# Patient Record
Sex: FEMALE | Race: OTHER | Employment: UNEMPLOYED | URBAN - METROPOLITAN AREA
[De-identification: names, ages, dates, MRNs, and addresses within clinical notes are randomized per-mention and may not be internally consistent; named-entity substitution may affect disease eponyms.]

---

## 2023-10-13 ENCOUNTER — HOSPITAL ENCOUNTER (INPATIENT)
Age: 88
LOS: 7 days | Discharge: NURSING, REHAB OR LONG-TERM CARE | End: 2023-10-20
Attending: STUDENT IN AN ORGANIZED HEALTH CARE EDUCATION/TRAINING PROGRAM | Admitting: STUDENT IN AN ORGANIZED HEALTH CARE EDUCATION/TRAINING PROGRAM
Payer: COMMERCIAL

## 2023-10-13 DIAGNOSIS — R79.89 ELEVATED BRAIN NATRIURETIC PEPTIDE (BNP) LEVEL: Primary | ICD-10-CM

## 2023-10-13 DIAGNOSIS — I48.92 ATRIAL FLUTTER, UNSPECIFIED TYPE (HCC): ICD-10-CM

## 2023-10-13 DIAGNOSIS — M97.8XXA PERI-PROSTHETIC FEMORAL SHAFT FRACTURE: ICD-10-CM

## 2023-10-13 DIAGNOSIS — I48.4 ATYPICAL ATRIAL FLUTTER (HCC): ICD-10-CM

## 2023-10-13 DIAGNOSIS — Z96.649 PERI-PROSTHETIC FEMORAL SHAFT FRACTURE: ICD-10-CM

## 2023-10-13 PROBLEM — M97.02XA PERIPROSTHETIC FRACTURE AROUND INTERNAL PROSTHETIC LEFT HIP JOINT, INITIAL ENCOUNTER (HCC): Status: ACTIVE | Noted: 2023-10-13

## 2023-10-13 PROCEDURE — 2580000003 HC RX 258: Performed by: NURSE PRACTITIONER

## 2023-10-13 PROCEDURE — 6370000000 HC RX 637 (ALT 250 FOR IP): Performed by: NURSE PRACTITIONER

## 2023-10-13 PROCEDURE — 6360000002 HC RX W HCPCS: Performed by: NURSE PRACTITIONER

## 2023-10-13 PROCEDURE — 99222 1ST HOSP IP/OBS MODERATE 55: CPT | Performed by: STUDENT IN AN ORGANIZED HEALTH CARE EDUCATION/TRAINING PROGRAM

## 2023-10-13 PROCEDURE — 2060000000 HC ICU INTERMEDIATE R&B

## 2023-10-13 RX ORDER — TRAMADOL HYDROCHLORIDE 50 MG/1
50 TABLET ORAL EVERY 6 HOURS PRN
Status: DISCONTINUED | OUTPATIENT
Start: 2023-10-13 | End: 2023-10-14

## 2023-10-13 RX ADMIN — TRAMADOL HYDROCHLORIDE 50 MG: 50 TABLET, COATED ORAL at 23:26

## 2023-10-13 RX ADMIN — PIPERACILLIN AND TAZOBACTAM 3375 MG: 3; .375 INJECTION, POWDER, LYOPHILIZED, FOR SOLUTION INTRAVENOUS at 22:35

## 2023-10-13 ASSESSMENT — PAIN DESCRIPTION - DESCRIPTORS: DESCRIPTORS: ACHING

## 2023-10-13 ASSESSMENT — PAIN SCALES - GENERAL: PAINLEVEL_OUTOF10: 8

## 2023-10-13 ASSESSMENT — PAIN DESCRIPTION - ORIENTATION: ORIENTATION: LEFT

## 2023-10-13 ASSESSMENT — PAIN DESCRIPTION - LOCATION: LOCATION: HIP

## 2023-10-14 ENCOUNTER — APPOINTMENT (OUTPATIENT)
Dept: CT IMAGING | Age: 88
End: 2023-10-14
Attending: STUDENT IN AN ORGANIZED HEALTH CARE EDUCATION/TRAINING PROGRAM
Payer: COMMERCIAL

## 2023-10-14 ENCOUNTER — APPOINTMENT (OUTPATIENT)
Dept: GENERAL RADIOLOGY | Age: 88
End: 2023-10-14
Attending: STUDENT IN AN ORGANIZED HEALTH CARE EDUCATION/TRAINING PROGRAM
Payer: COMMERCIAL

## 2023-10-14 PROBLEM — S72.002A CLOSED FRACTURE OF NECK OF LEFT FEMUR (HCC): Status: ACTIVE | Noted: 2023-10-14

## 2023-10-14 PROBLEM — T84.031A: Status: ACTIVE | Noted: 2023-10-14

## 2023-10-14 PROBLEM — Z01.818 PRE-OP EVALUATION: Status: ACTIVE | Noted: 2023-10-14

## 2023-10-14 PROBLEM — R79.89 ELEVATED BRAIN NATRIURETIC PEPTIDE (BNP) LEVEL: Status: ACTIVE | Noted: 2023-10-14

## 2023-10-14 PROBLEM — R54 ADVANCED AGE: Status: ACTIVE | Noted: 2023-10-14

## 2023-10-14 LAB
ALBUMIN SERPL-MCNC: 3 G/DL (ref 3.5–5.2)
ALBUMIN/GLOB SERPL: 0.8 {RATIO} (ref 1–2.5)
ALP SERPL-CCNC: 182 U/L (ref 35–104)
ALT SERPL-CCNC: 12 U/L (ref 5–33)
ANION GAP SERPL CALCULATED.3IONS-SCNC: 12 MMOL/L (ref 9–17)
ANTI-XA UNFRAC HEPARIN: 1.84 IU/L
ANTI-XA UNFRAC HEPARIN: >2 IU/L
AST SERPL-CCNC: 22 U/L
BASOPHILS # BLD: 0.04 K/UL (ref 0–0.2)
BASOPHILS NFR BLD: 0 % (ref 0–2)
BILIRUB SERPL-MCNC: 0.4 MG/DL (ref 0.3–1.2)
BNP SERPL-MCNC: 3306 PG/ML
BUN SERPL-MCNC: 33 MG/DL (ref 8–23)
CALCIUM SERPL-MCNC: 8.8 MG/DL (ref 8.6–10.4)
CHLORIDE SERPL-SCNC: 81 MMOL/L (ref 98–107)
CO2 SERPL-SCNC: 33 MMOL/L (ref 20–31)
CREAT SERPL-MCNC: 1 MG/DL (ref 0.5–0.9)
CRP SERPL HS-MCNC: 126.7 MG/L (ref 0–5)
EKG ATRIAL RATE: 258 BPM
EKG ATRIAL RATE: 87 BPM
EKG Q-T INTERVAL: 378 MS
EKG Q-T INTERVAL: 404 MS
EKG QRS DURATION: 90 MS
EKG QRS DURATION: 96 MS
EKG QTC CALCULATION (BAZETT): 380 MS
EKG QTC CALCULATION (BAZETT): 404 MS
EKG R AXIS: 66 DEGREES
EKG R AXIS: 67 DEGREES
EKG T AXIS: -33 DEGREES
EKG T AXIS: -38 DEGREES
EKG VENTRICULAR RATE: 60 BPM
EKG VENTRICULAR RATE: 61 BPM
EOSINOPHIL # BLD: 0.16 K/UL (ref 0–0.44)
EOSINOPHILS RELATIVE PERCENT: 1 % (ref 1–4)
ERYTHROCYTE [DISTWIDTH] IN BLOOD BY AUTOMATED COUNT: 15.2 % (ref 11.8–14.4)
ERYTHROCYTE [SEDIMENTATION RATE] IN BLOOD BY PHOTOMETRIC METHOD: 74 MM/HR (ref 0–30)
GFR SERPL CREATININE-BSD FRML MDRD: 54 ML/MIN/1.73M2
GLUCOSE BLD-MCNC: 165 MG/DL (ref 65–105)
GLUCOSE BLD-MCNC: 203 MG/DL (ref 65–105)
GLUCOSE BLD-MCNC: 207 MG/DL (ref 65–105)
GLUCOSE BLD-MCNC: 209 MG/DL (ref 65–105)
GLUCOSE BLD-MCNC: 246 MG/DL (ref 65–105)
GLUCOSE SERPL-MCNC: 197 MG/DL (ref 70–99)
HCT VFR BLD AUTO: 30.1 % (ref 36.3–47.1)
HGB BLD-MCNC: 9.4 G/DL (ref 11.9–15.1)
IMM GRANULOCYTES # BLD AUTO: 0.06 K/UL (ref 0–0.3)
IMM GRANULOCYTES NFR BLD: 1 %
INR PPP: 1.8
LACTIC ACID, WHOLE BLOOD: 1.3 MMOL/L (ref 0.7–2.1)
LYMPHOCYTES NFR BLD: 1.03 K/UL (ref 1.1–3.7)
LYMPHOCYTES RELATIVE PERCENT: 8 % (ref 24–43)
MAGNESIUM SERPL-MCNC: 1.9 MG/DL (ref 1.6–2.6)
MCH RBC QN AUTO: 28.3 PG (ref 25.2–33.5)
MCHC RBC AUTO-ENTMCNC: 31.2 G/DL (ref 28.4–34.8)
MCV RBC AUTO: 90.7 FL (ref 82.6–102.9)
MONOCYTES NFR BLD: 0.86 K/UL (ref 0.1–1.2)
MONOCYTES NFR BLD: 7 % (ref 3–12)
NEUTROPHILS NFR BLD: 83 % (ref 36–65)
NEUTS SEG NFR BLD: 10.3 K/UL (ref 1.5–8.1)
NRBC BLD-RTO: 0 PER 100 WBC
OSMOLALITY SERPL: 289 MOSM/KG (ref 275–295)
OSMOLALITY UR: 296 MOSM/KG (ref 80–1300)
OSMOLALITY UR: 298 MOSM/KG (ref 80–1300)
PARTIAL THROMBOPLASTIN TIME: 161.4 SEC (ref 23–36.5)
PARTIAL THROMBOPLASTIN TIME: 45.6 SEC (ref 23–36.5)
PARTIAL THROMBOPLASTIN TIME: 47.2 SEC (ref 23–36.5)
PLATELET # BLD AUTO: 375 K/UL (ref 138–453)
PMV BLD AUTO: 9.9 FL (ref 8.1–13.5)
POTASSIUM SERPL-SCNC: 3.7 MMOL/L (ref 3.7–5.3)
PROT SERPL-MCNC: 6.9 G/DL (ref 6.4–8.3)
PROTHROMBIN TIME: 20.5 SEC (ref 11.7–14.9)
RBC # BLD AUTO: 3.32 M/UL (ref 3.95–5.11)
RBC # BLD: ABNORMAL 10*6/UL
SODIUM SERPL-SCNC: 126 MMOL/L (ref 135–144)
SODIUM UR-SCNC: 31 MMOL/L
SODIUM UR-SCNC: 33 MMOL/L
WBC OTHER # BLD: 12.5 K/UL (ref 3.5–11.3)

## 2023-10-14 PROCEDURE — 6370000000 HC RX 637 (ALT 250 FOR IP): Performed by: STUDENT IN AN ORGANIZED HEALTH CARE EDUCATION/TRAINING PROGRAM

## 2023-10-14 PROCEDURE — 83880 ASSAY OF NATRIURETIC PEPTIDE: CPT

## 2023-10-14 PROCEDURE — 86140 C-REACTIVE PROTEIN: CPT

## 2023-10-14 PROCEDURE — 2060000000 HC ICU INTERMEDIATE R&B

## 2023-10-14 PROCEDURE — 83930 ASSAY OF BLOOD OSMOLALITY: CPT

## 2023-10-14 PROCEDURE — 83605 ASSAY OF LACTIC ACID: CPT

## 2023-10-14 PROCEDURE — 83735 ASSAY OF MAGNESIUM: CPT

## 2023-10-14 PROCEDURE — 85025 COMPLETE CBC W/AUTO DIFF WBC: CPT

## 2023-10-14 PROCEDURE — 6360000002 HC RX W HCPCS: Performed by: NURSE PRACTITIONER

## 2023-10-14 PROCEDURE — 85730 THROMBOPLASTIN TIME PARTIAL: CPT

## 2023-10-14 PROCEDURE — 82947 ASSAY GLUCOSE BLOOD QUANT: CPT

## 2023-10-14 PROCEDURE — 85610 PROTHROMBIN TIME: CPT

## 2023-10-14 PROCEDURE — 99222 1ST HOSP IP/OBS MODERATE 55: CPT | Performed by: INTERNAL MEDICINE

## 2023-10-14 PROCEDURE — 99233 SBSQ HOSP IP/OBS HIGH 50: CPT | Performed by: STUDENT IN AN ORGANIZED HEALTH CARE EDUCATION/TRAINING PROGRAM

## 2023-10-14 PROCEDURE — 51798 US URINE CAPACITY MEASURE: CPT

## 2023-10-14 PROCEDURE — 51702 INSERT TEMP BLADDER CATH: CPT

## 2023-10-14 PROCEDURE — 6360000002 HC RX W HCPCS: Performed by: STUDENT IN AN ORGANIZED HEALTH CARE EDUCATION/TRAINING PROGRAM

## 2023-10-14 PROCEDURE — 84300 ASSAY OF URINE SODIUM: CPT

## 2023-10-14 PROCEDURE — 87040 BLOOD CULTURE FOR BACTERIA: CPT

## 2023-10-14 PROCEDURE — 83935 ASSAY OF URINE OSMOLALITY: CPT

## 2023-10-14 PROCEDURE — 93010 ELECTROCARDIOGRAM REPORT: CPT | Performed by: INTERNAL MEDICINE

## 2023-10-14 PROCEDURE — 85520 HEPARIN ASSAY: CPT

## 2023-10-14 PROCEDURE — 85652 RBC SED RATE AUTOMATED: CPT

## 2023-10-14 PROCEDURE — 80053 COMPREHEN METABOLIC PANEL: CPT

## 2023-10-14 PROCEDURE — 36415 COLL VENOUS BLD VENIPUNCTURE: CPT

## 2023-10-14 PROCEDURE — 93005 ELECTROCARDIOGRAM TRACING: CPT | Performed by: NURSE PRACTITIONER

## 2023-10-14 PROCEDURE — 71045 X-RAY EXAM CHEST 1 VIEW: CPT

## 2023-10-14 PROCEDURE — 2580000003 HC RX 258: Performed by: NURSE PRACTITIONER

## 2023-10-14 PROCEDURE — 51701 INSERT BLADDER CATHETER: CPT

## 2023-10-14 PROCEDURE — 6370000000 HC RX 637 (ALT 250 FOR IP): Performed by: NURSE PRACTITIONER

## 2023-10-14 PROCEDURE — 72192 CT PELVIS W/O DYE: CPT

## 2023-10-14 RX ORDER — INSULIN LISPRO 100 [IU]/ML
0-8 INJECTION, SOLUTION INTRAVENOUS; SUBCUTANEOUS
Status: DISCONTINUED | OUTPATIENT
Start: 2023-10-14 | End: 2023-10-20 | Stop reason: HOSPADM

## 2023-10-14 RX ORDER — INSULIN LISPRO 100 [IU]/ML
0-4 INJECTION, SOLUTION INTRAVENOUS; SUBCUTANEOUS NIGHTLY
Status: DISCONTINUED | OUTPATIENT
Start: 2023-10-14 | End: 2023-10-20 | Stop reason: HOSPADM

## 2023-10-14 RX ORDER — GABAPENTIN 300 MG/1
300 CAPSULE ORAL 3 TIMES DAILY
COMMUNITY

## 2023-10-14 RX ORDER — LANOLIN ALCOHOL/MO/W.PET/CERES
325 CREAM (GRAM) TOPICAL
Status: DISCONTINUED | OUTPATIENT
Start: 2023-10-15 | End: 2023-10-20 | Stop reason: HOSPADM

## 2023-10-14 RX ORDER — FERROUS SULFATE 325(65) MG
325 TABLET ORAL
Status: DISCONTINUED | OUTPATIENT
Start: 2023-10-15 | End: 2023-10-14

## 2023-10-14 RX ORDER — METFORMIN HYDROCHLORIDE 500 MG/1
500 TABLET, EXTENDED RELEASE ORAL
COMMUNITY

## 2023-10-14 RX ORDER — LORAZEPAM 2 MG/ML
0.25 INJECTION INTRAMUSCULAR ONCE
Status: COMPLETED | OUTPATIENT
Start: 2023-10-14 | End: 2023-10-14

## 2023-10-14 RX ORDER — HEPARIN SODIUM 10000 [USP'U]/100ML
5-30 INJECTION, SOLUTION INTRAVENOUS CONTINUOUS
Status: DISCONTINUED | OUTPATIENT
Start: 2023-10-14 | End: 2023-10-18

## 2023-10-14 RX ORDER — ROPINIROLE 1 MG/1
1 TABLET, FILM COATED ORAL 3 TIMES DAILY
COMMUNITY

## 2023-10-14 RX ORDER — METOLAZONE 2.5 MG/1
2.5 TABLET ORAL DAILY
COMMUNITY

## 2023-10-14 RX ORDER — LANOLIN ALCOHOL/MO/W.PET/CERES
800 CREAM (GRAM) TOPICAL DAILY
COMMUNITY

## 2023-10-14 RX ORDER — LOVASTATIN 10 MG/1
10 TABLET ORAL NIGHTLY
COMMUNITY

## 2023-10-14 RX ORDER — ROPINIROLE 1 MG/1
1 TABLET, FILM COATED ORAL 3 TIMES DAILY
Status: DISCONTINUED | OUTPATIENT
Start: 2023-10-14 | End: 2023-10-20 | Stop reason: HOSPADM

## 2023-10-14 RX ORDER — LISINOPRIL 10 MG/1
10 TABLET ORAL DAILY
Status: ON HOLD | COMMUNITY
End: 2023-10-20 | Stop reason: HOSPADM

## 2023-10-14 RX ORDER — HEPARIN SODIUM 1000 [USP'U]/ML
2000 INJECTION, SOLUTION INTRAVENOUS; SUBCUTANEOUS PRN
Status: DISCONTINUED | OUTPATIENT
Start: 2023-10-14 | End: 2023-10-18

## 2023-10-14 RX ORDER — HEPARIN SODIUM 1000 [USP'U]/ML
4000 INJECTION, SOLUTION INTRAVENOUS; SUBCUTANEOUS PRN
Status: DISCONTINUED | OUTPATIENT
Start: 2023-10-14 | End: 2023-10-18

## 2023-10-14 RX ORDER — FERROUS SULFATE 325(65) MG
325 TABLET ORAL
COMMUNITY

## 2023-10-14 RX ORDER — TRAMADOL HYDROCHLORIDE 50 MG/1
50 TABLET ORAL EVERY 4 HOURS PRN
Status: DISCONTINUED | OUTPATIENT
Start: 2023-10-14 | End: 2023-10-20 | Stop reason: HOSPADM

## 2023-10-14 RX ORDER — DEXTROSE MONOHYDRATE 100 MG/ML
INJECTION, SOLUTION INTRAVENOUS CONTINUOUS PRN
Status: DISCONTINUED | OUTPATIENT
Start: 2023-10-14 | End: 2023-10-20 | Stop reason: HOSPADM

## 2023-10-14 RX ORDER — GABAPENTIN 300 MG/1
300 CAPSULE ORAL 3 TIMES DAILY
Status: DISCONTINUED | OUTPATIENT
Start: 2023-10-14 | End: 2023-10-20 | Stop reason: HOSPADM

## 2023-10-14 RX ORDER — METOLAZONE 2.5 MG/1
2.5 TABLET ORAL DAILY
Status: DISCONTINUED | OUTPATIENT
Start: 2023-10-14 | End: 2023-10-20 | Stop reason: HOSPADM

## 2023-10-14 RX ORDER — INSULIN LISPRO 100 [IU]/ML
0-4 INJECTION, SOLUTION INTRAVENOUS; SUBCUTANEOUS
Status: DISCONTINUED | OUTPATIENT
Start: 2023-10-14 | End: 2023-10-14

## 2023-10-14 RX ORDER — CYANOCOBALAMIN (VITAMIN B-12) 500 MCG
800 TABLET ORAL DAILY
Status: DISCONTINUED | OUTPATIENT
Start: 2023-10-14 | End: 2023-10-14

## 2023-10-14 RX ORDER — INSULIN LISPRO 100 [IU]/ML
0-4 INJECTION, SOLUTION INTRAVENOUS; SUBCUTANEOUS NIGHTLY
Status: DISCONTINUED | OUTPATIENT
Start: 2023-10-14 | End: 2023-10-14

## 2023-10-14 RX ORDER — FOLIC ACID 1 MG/1
1000 TABLET ORAL DAILY
Status: DISCONTINUED | OUTPATIENT
Start: 2023-10-14 | End: 2023-10-20 | Stop reason: HOSPADM

## 2023-10-14 RX ORDER — TRAMADOL HYDROCHLORIDE 50 MG/1
50 TABLET ORAL EVERY 6 HOURS PRN
COMMUNITY

## 2023-10-14 RX ORDER — ATORVASTATIN CALCIUM 10 MG/1
10 TABLET, FILM COATED ORAL DAILY
Status: DISCONTINUED | OUTPATIENT
Start: 2023-10-14 | End: 2023-10-20 | Stop reason: HOSPADM

## 2023-10-14 RX ORDER — HEPARIN SODIUM 1000 [USP'U]/ML
4000 INJECTION, SOLUTION INTRAVENOUS; SUBCUTANEOUS ONCE
Status: COMPLETED | OUTPATIENT
Start: 2023-10-14 | End: 2023-10-14

## 2023-10-14 RX ORDER — LATANOPROST 50 UG/ML
1 SOLUTION/ DROPS OPHTHALMIC NIGHTLY
COMMUNITY

## 2023-10-14 RX ORDER — FENTANYL CITRATE 50 UG/ML
25 INJECTION, SOLUTION INTRAMUSCULAR; INTRAVENOUS ONCE
Status: COMPLETED | OUTPATIENT
Start: 2023-10-14 | End: 2023-10-14

## 2023-10-14 RX ADMIN — LORAZEPAM 0.26 MG: 2 INJECTION INTRAMUSCULAR; INTRAVENOUS at 06:00

## 2023-10-14 RX ADMIN — GABAPENTIN 300 MG: 300 CAPSULE ORAL at 20:57

## 2023-10-14 RX ADMIN — TRAMADOL HYDROCHLORIDE 50 MG: 50 TABLET, COATED ORAL at 10:44

## 2023-10-14 RX ADMIN — HEPARIN SODIUM 12 UNITS/KG/HR: 10000 INJECTION, SOLUTION INTRAVENOUS at 07:01

## 2023-10-14 RX ADMIN — PIPERACILLIN AND TAZOBACTAM 3375 MG: 3; .375 INJECTION, POWDER, LYOPHILIZED, FOR SOLUTION INTRAVENOUS at 14:51

## 2023-10-14 RX ADMIN — ROPINIROLE HYDROCHLORIDE 1 MG: 1 TABLET, FILM COATED ORAL at 16:24

## 2023-10-14 RX ADMIN — METOLAZONE 2.5 MG: 2.5 TABLET ORAL at 16:22

## 2023-10-14 RX ADMIN — ROPINIROLE HYDROCHLORIDE 1 MG: 1 TABLET, FILM COATED ORAL at 20:57

## 2023-10-14 RX ADMIN — INSULIN LISPRO 2 UNITS: 100 INJECTION, SOLUTION INTRAVENOUS; SUBCUTANEOUS at 17:09

## 2023-10-14 RX ADMIN — VANCOMYCIN HYDROCHLORIDE 1000 MG: 1 INJECTION, POWDER, LYOPHILIZED, FOR SOLUTION INTRAVENOUS at 17:54

## 2023-10-14 RX ADMIN — FOLIC ACID 1000 MCG: 1 TABLET ORAL at 16:22

## 2023-10-14 RX ADMIN — FENTANYL CITRATE 25 MCG: 50 INJECTION, SOLUTION INTRAMUSCULAR; INTRAVENOUS at 09:20

## 2023-10-14 RX ADMIN — INSULIN LISPRO 1 UNITS: 100 INJECTION, SOLUTION INTRAVENOUS; SUBCUTANEOUS at 09:24

## 2023-10-14 RX ADMIN — ATORVASTATIN CALCIUM 10 MG: 10 TABLET, FILM COATED ORAL at 16:22

## 2023-10-14 RX ADMIN — TRAMADOL HYDROCHLORIDE 50 MG: 50 TABLET, COATED ORAL at 04:36

## 2023-10-14 RX ADMIN — PIPERACILLIN AND TAZOBACTAM 3375 MG: 3; .375 INJECTION, POWDER, LYOPHILIZED, FOR SOLUTION INTRAVENOUS at 05:41

## 2023-10-14 RX ADMIN — HEPARIN SODIUM 4000 UNITS: 1000 INJECTION INTRAVENOUS; SUBCUTANEOUS at 06:59

## 2023-10-14 RX ADMIN — PIPERACILLIN AND TAZOBACTAM 3375 MG: 3; .375 INJECTION, POWDER, LYOPHILIZED, FOR SOLUTION INTRAVENOUS at 21:01

## 2023-10-14 RX ADMIN — GABAPENTIN 300 MG: 300 CAPSULE ORAL at 16:23

## 2023-10-14 ASSESSMENT — PAIN DESCRIPTION - ORIENTATION
ORIENTATION: LEFT

## 2023-10-14 ASSESSMENT — ENCOUNTER SYMPTOMS
ABDOMINAL PAIN: 0
RHINORRHEA: 0
ABDOMINAL DISTENTION: 0
SHORTNESS OF BREATH: 0
COUGH: 0
WHEEZING: 0
BACK PAIN: 0

## 2023-10-14 ASSESSMENT — PAIN DESCRIPTION - LOCATION
LOCATION: HIP

## 2023-10-14 ASSESSMENT — PAIN SCALES - GENERAL
PAINLEVEL_OUTOF10: 10
PAINLEVEL_OUTOF10: 10
PAINLEVEL_OUTOF10: 8
PAINLEVEL_OUTOF10: 10
PAINLEVEL_OUTOF10: 0
PAINLEVEL_OUTOF10: 2

## 2023-10-14 ASSESSMENT — PAIN DESCRIPTION - DESCRIPTORS
DESCRIPTORS: DISCOMFORT
DESCRIPTORS: ACHING

## 2023-10-14 ASSESSMENT — PAIN SCALES - WONG BAKER: WONGBAKER_NUMERICALRESPONSE: 0

## 2023-10-14 NOTE — PROGRESS NOTES
Patient's son David Maurice will be her Sunday at 1:00 pm to speak with Dr. Kasia Cedeno regarding surgery.

## 2023-10-14 NOTE — PROGRESS NOTES
Writer called patients assisted living facility once again inquiring about the home medication list. Patient unsure of what she takes so unable to complete admission questions still.  List was asked to be faxed around 9:30pm pn 10/13

## 2023-10-14 NOTE — CARE COORDINATION
Case Management Assessment  Initial Evaluation    Date/Time of Evaluation: 10/14/2023 2:50 PM  Assessment Completed by: Qing Franklin RN    If patient is discharged prior to next notation, then this note serves as note for discharge by case management. 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 02XA]                   Date / Time: 10/13/2023  9:05 PM    Patient Admission Status: Inpatient   Readmission Risk (Low < 19, Mod (19-27), High > 27): Readmission Risk Score: 15.1    Current PCP: No primary care provider on file. PCP verified by CM? (P) Yes Corinne Puga MD)    Chart Reviewed: Yes      History Provided by: Clay Ramirez) Patient  Patient Orientation: (P) Alert and Oriented, Person, Place, Situation, Self    Patient Cognition: (P) Alert    Hospitalization in the last 30 days (Readmission):  No    If yes, Readmission Assessment in  Navigator will be completed.     Advance Directives:      Code Status: DNR-CCA   Patient's Primary Decision Maker is: (P) Legal Next of Kin      Discharge Planning:    Patient lives with: (P) Other (Comment) ([address]) Type of Home: (P) Assisted living  Primary Care Giver: (P) Self  Patient Support Systems include: (P) Children, Friends/Neighbors, Other (Comment) (Assisted Living staff)   Current Financial resources: (P) Medicare  Current community resources: (P) None  Current services prior to admission: (P) [address] [facility], Other (Comment) (Assisted Living)            Current DME: (P) Shower Chair, Trent Fetch, Wheelchair            Type of Home Care services:  (P) Aide Services, Skilled Therapy    ADLS  Prior functional level: (P) Assistance with the following:, Cooking, Housework, Shopping  Current functional level: (P) Assistance with the following:, Cooking, Housework, Shopping, Mobility    PT AM-PAC:   /24  OT AM-PAC: /24    Family can provide assistance at DC: (P) Other (comment) (Family and facility staff)  Would you like Case Management to discuss the discharge plan with any other family members/significant others, and if so, who? (P) No  Plans to Return to Present Housing: (P) Unknown at present  Other Identified Issues/Barriers to RETURNING to current housing: none  Potential Assistance needed at discharge: (P) [address]            Potential DME:  none  Patient expects to discharge to: (P) Unknown  Plan for transportation at discharge: (P) Family (Family will transport)    Financial    Payor: [employer] / Plan: [employer] / Product Type: *No Product type* /     Does insurance require precert for SNF: Yes    Potential assistance Purchasing Medications: No  Meds-to-Beds request: Yes    No Pharmacies Listed    Notes:    Factors facilitating achievement of predicted outcomes: Family support, Caregiver support, Cooperative, Pleasant, Has needed Durable Medical Equipment at home, Home is wheelchair accessible, and Knowledge about rehab    Barriers to discharge: Pain, Decreased endurance, Lower extremity weakness, Medical complications, and Wound Care    Additional Case Management Notes: Transitional planning: [facility] for [facility]. Address, emergency contacts, PCP and insurance confirmed with patient. Patient denies any needs at this time. 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 33ED]    IF APPLICABLE: The Patient and/or patient representative Graciela Barfield and her family were provided with a choice of provider and agrees with the discharge plan.  Freedom of choice list with basic dialogue that supports the patient's individualized plan of care/goals and shares the quality data associated with the

## 2023-10-14 NOTE — H&P
equal air entry, clear to ausculation, no wheezing, rales or rhonchi, normal effort  Cardiovascular: normal rate, regular rhythm, no murmur, gallop, rub  Abdomen: Soft, nontender, nondistended, normal bowel sounds, no hepatomegaly or splenomegaly  Skin: No gross lesions, rashes, bruising or bleeding on exposed skin area  Extremities: peripheral pulses palpable, no pedal edema or calf pain with palpation, decreased range of motion on the left leg due to severe pain on the left side of the hip. Psych: normal affect    Investigations:      Laboratory Testing:  No results found for this or any previous visit (from the past 24 hour(s)). Imaging/Diagnostics:  No results found. 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, initial encounter ([address]) 10/13/2023 Yes    Hypertension 10/14/2023 Yes    Diabetes mellitus ([address]) 10/14/2023 Yes    Hypercholesterolemia 10/14/2023 Yes    Atrial flutter ([address]) 10/14/2023 Yes    Hyponatremia 10/14/2023 Yes    Fall 10/14/2023 Yes    Leukocytosis 10/14/2023 Yes    Anemia 10/14/2023 Yes    Hypokalemia 10/14/2023 Yes    Renal insufficiency 10/14/2023 Yes       Plan:     Patient status inpatient in the Progressive Unit/Step down    Intertrochanteric fracture of left femur:  Generalized weakness with falls:  Dizziness:   soft tissue gas in the region of the left hip:  Leukocytosis, elevated ESR and CRP:    N.p.o.  Orthopedic consultation  IV vancomycin and Zosyn  Infectious disease consultation  Follow-up with blood culture results  Follow-up with CBC in the morning  Lactic acid  IV hydration    6. Hyponatremia: Sodium of 126 on admission. Patient is given IV hydration we will repeat the sodium level  Correction should be 6 to 8 milliequivalents in next 24 hours  Follow-up with BMP  Urine sodium and osmolality    7. Diabetes mellitus:  On metformin at home, placing on insulin sliding scale  8. Atrial flutter: On Eliquis at home, currently holding for possible surgery  9. Renal insufficiency: Patient presented with creatinine of 1.3, unknown baseline creatinine patient is given IV fluid follow-up with BMP in the morning. No known diagnosis of CKD in the chart. 10.  Hypertension/hypercholesterolemia: Resume home medication once patient start on diet  11 hypokalemia. Repletion follow-up with BMP in the morning  12. Urinary retention: Patient complains of not being able to pee while on the bed. Denies any dysuria. Placing Crowe's catheter since patient has a hip fracture. 13.  History of CHF: No echocardiogram in the chart, patient is on metolazone 2.5 mg daily, patient is on 2 L of oxygen saturating 94% currently denies any orthopnea PND or dyspnea on exertion no suspicion for CHF exacerbation. Follow-up with chest x-ray and BNP. Also getting EKG     Consultations:   IP CONSULT TO PHARMACY  IP CONSULT TO INFECTIOUS DISEASES     Patient is admitted as inpatient status because of co-morbidities listed above, severity of signs and symptoms as outlined, requirement for current medical therapies and most importantly because of direct risk to patient if care not provided in a hospital setting. Expected length of stay > 48 hours. Lorraine Campos MD  10/14/2023  4:19 AM    Copy sent to Dr. Irizarry primary care provider on file.

## 2023-10-14 NOTE — PLAN OF CARE
Problem: Discharge Planning  Goal: Discharge to home or other facility with appropriate resources  10/14/2023 1047 by Stephanie Costa RN  Outcome: Progressing  10/14/2023 0037 by Sophia Miranda RN  Outcome: Progressing     Problem: Skin/Tissue Integrity  Goal: Absence of new skin breakdown  Description: 1. Monitor for areas of redness and/or skin breakdown  2. Assess vascular access sites hourly  3. Every 4-6 hours minimum:  Change oxygen saturation probe site  4. Every 4-6 hours:  If on nasal continuous positive airway pressure, respiratory therapy assess nares and determine need for appliance change or resting period.   10/14/2023 1047 by Stephanie Costa RN  Outcome: Progressing  10/14/2023 0037 by Sophia Miranda RN  Outcome: Progressing     Problem: Safety - Adult  Goal: Free from fall injury  10/14/2023 1047 by Stephanie Costa RN  Outcome: Progressing  10/14/2023 0037 by Sophia Miranda RN  Outcome: Progressing     Problem: ABCDS Injury Assessment  Goal: Absence of physical injury  10/14/2023 1047 by Stephanie Costa RN  Outcome: Progressing  10/14/2023 0037 by Sophia Miranda RN  Outcome: Progressing     Problem: Pain  Goal: Verbalizes/displays adequate comfort level or baseline comfort level  Outcome: Progressing

## 2023-10-14 NOTE — PROGRESS NOTES
New patient admitted to room from [address] [facility] and assessment charted. Writer contacted patients assisted living to clarify home medication list. Stated they will fax over information.  On call NP notified of admission as well as status of home medication list.

## 2023-10-14 NOTE — PROGRESS NOTES
hours.    Invalid input(s): \"PT\"  Chemistry:No results for input(s): \"NA\", \"K\", \"CL\", \"CO2\", \"GLUCOSE\", \"BUN\", \"CREATININE\", \"MG\", \"ANIONGAP\", \"LABGLOM\", \"GFRAA\", \"CALCIUM\", \"CAION\", \"PHOS\", \"PSA\", \"PROBNP\", \"TROPHS\", \"CKTOTAL\", \"CKMB\", \"CKMBINDEX\", \"MYOGLOBIN\", \"DIGOXIN\", \"LACTACIDWB\" in the last 72 hours. Recent Labs     10/14/23  0423   POCGLU 209*     ABG:No results found for: \"POCPH\", \"PHART\", \"PH\", \"POCPCO2\", \"YEN2QIP\", \"PCO2\", \"POCPO2\", \"PO2ART\", \"PO2\", \"POCHCO3\", \"UID4ZPC\", \"HCO3\", \"NBEA\", \"PBEA\", \"BEART\", \"BE\", \"THGBART\", \"THB\", \"HIU6USF\", \"UZUF3PTB\", \"O8CEURTZ\", \"O2SAT\", \"FIO2\"  No results found for: \"SPECIAL\"  No results found for: \"CULTURE\"    Radiology:  No results found. Physical Examination:        Physical Exam  Constitutional:       Appearance: She is well-developed. HENT:      Head: Normocephalic and atraumatic. Eyes:      Conjunctiva/sclera: Conjunctivae normal.      Pupils: Pupils are equal, round, and reactive to light. Neck:      Thyroid: No thyromegaly. Vascular: No JVD. Cardiovascular:      Rate and Rhythm: Normal rate and regular rhythm. Heart sounds: Normal heart sounds. No murmur heard. Pulmonary:      Effort: Pulmonary effort is normal.      Breath sounds: Normal breath sounds. No wheezing. Abdominal:      General: Bowel sounds are normal. There is no distension. Palpations: Abdomen is soft. Tenderness: There is no abdominal tenderness. There is no rebound. Musculoskeletal:         General: Tenderness present. Normal range of motion. Cervical back: Normal range of motion and neck supple. Skin:     Findings: Lesion present. Comments: Dressing in place over surgical site wound. Neurological:      Mental Status: She is alert and oriented to person, place, and time. Cranial Nerves: No cranial nerve deficit.    Psychiatric:         Behavior: Behavior normal.         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 Elevated ESR and CRP, WBC. Evaluated by orthopedic surgery. Plan for OR on 10/16 for irrigation debridement of left hip wound and possible explanation of hardware. Pain control. Antiemetics. Consulted ID as well. On Zosyn and vancomycin for now. H/o Atrial flutter- on eliquis at home. Oral AC on hold for possible procedure. Continued on heparin drip for now. Cardiology consulted for preop clearance. HFpEF- EF 60-65% (1/22). -elevated pro bnp. Clinically stable. Pending ECHO  Hyponatremia/hypochloremia-continue IV fluids  Diabetes mellitus type 2-on insulin sliding scale. Hypoglycemia protocol. POCT glucose checks. Leukocytosis from possible infection of surgical wound site. Xssnxi-yoxgwi-do iron studies. On IV Venofer for 3 doses. 7.  Hypertension: Currently normotensive off medications. Continue to monitor. 8. HPL- Resume home dose of statins.        Rissa Batista MD  10/14/2023  7:46 AM

## 2023-10-15 ENCOUNTER — ANESTHESIA EVENT (OUTPATIENT)
Dept: OPERATING ROOM | Age: 88
End: 2023-10-15
Payer: COMMERCIAL

## 2023-10-15 LAB
ABO + RH BLD: NORMAL
ANION GAP SERPL CALCULATED.3IONS-SCNC: 11 MMOL/L (ref 9–17)
ARM BAND NUMBER: NORMAL
BASOPHILS # BLD: <0.03 K/UL (ref 0–0.2)
BASOPHILS NFR BLD: 0 % (ref 0–2)
BLOOD BANK SAMPLE EXPIRATION: NORMAL
BLOOD GROUP ANTIBODIES SERPL: NEGATIVE
BUN SERPL-MCNC: 29 MG/DL (ref 8–23)
CALCIUM SERPL-MCNC: 9 MG/DL (ref 8.6–10.4)
CHLORIDE SERPL-SCNC: 86 MMOL/L (ref 98–107)
CO2 SERPL-SCNC: 31 MMOL/L (ref 20–31)
CREAT SERPL-MCNC: 1 MG/DL (ref 0.5–0.9)
CRP SERPL HS-MCNC: 97.7 MG/L (ref 0–5)
EOSINOPHIL # BLD: 0.18 K/UL (ref 0–0.44)
EOSINOPHILS RELATIVE PERCENT: 2 % (ref 1–4)
ERYTHROCYTE [DISTWIDTH] IN BLOOD BY AUTOMATED COUNT: 15.3 % (ref 11.8–14.4)
FERRITIN SERPL-MCNC: 1019 NG/ML (ref 13–150)
FOLATE SERPL-MCNC: >20 NG/ML (ref 4.8–24.2)
GFR SERPL CREATININE-BSD FRML MDRD: 54 ML/MIN/1.73M2
GLUCOSE BLD-MCNC: 200 MG/DL (ref 65–105)
GLUCOSE BLD-MCNC: 212 MG/DL (ref 65–105)
GLUCOSE BLD-MCNC: 232 MG/DL (ref 65–105)
GLUCOSE BLD-MCNC: 246 MG/DL (ref 65–105)
GLUCOSE BLD-MCNC: 280 MG/DL (ref 65–105)
GLUCOSE SERPL-MCNC: 201 MG/DL (ref 70–99)
HCT VFR BLD AUTO: 31.4 % (ref 36.3–47.1)
HGB BLD-MCNC: 9.4 G/DL (ref 11.9–15.1)
IMM GRANULOCYTES # BLD AUTO: 0.06 K/UL (ref 0–0.3)
IMM GRANULOCYTES NFR BLD: 1 %
IRON SATN MFR SERPL: 23 % (ref 20–55)
IRON SERPL-MCNC: 40 UG/DL (ref 37–145)
LYMPHOCYTES NFR BLD: 0.95 K/UL (ref 1.1–3.7)
LYMPHOCYTES RELATIVE PERCENT: 10 % (ref 24–43)
MAGNESIUM SERPL-MCNC: 1.9 MG/DL (ref 1.6–2.6)
MCH RBC QN AUTO: 28.5 PG (ref 25.2–33.5)
MCHC RBC AUTO-ENTMCNC: 29.9 G/DL (ref 28.4–34.8)
MCV RBC AUTO: 95.2 FL (ref 82.6–102.9)
MONOCYTES NFR BLD: 0.79 K/UL (ref 0.1–1.2)
MONOCYTES NFR BLD: 8 % (ref 3–12)
NEUTROPHILS NFR BLD: 79 % (ref 36–65)
NEUTS SEG NFR BLD: 7.45 K/UL (ref 1.5–8.1)
NRBC BLD-RTO: 0 PER 100 WBC
PARTIAL THROMBOPLASTIN TIME: 51.8 SEC (ref 23–36.5)
PARTIAL THROMBOPLASTIN TIME: 71 SEC (ref 23–36.5)
PARTIAL THROMBOPLASTIN TIME: 71.8 SEC (ref 23–36.5)
PARTIAL THROMBOPLASTIN TIME: 82.7 SEC (ref 23–36.5)
PLATELET # BLD AUTO: 386 K/UL (ref 138–453)
PMV BLD AUTO: 9.2 FL (ref 8.1–13.5)
POTASSIUM SERPL-SCNC: 3.5 MMOL/L (ref 3.7–5.3)
RBC # BLD AUTO: 3.3 M/UL (ref 3.95–5.11)
RBC # BLD: ABNORMAL 10*6/UL
SODIUM SERPL-SCNC: 128 MMOL/L (ref 135–144)
TIBC SERPL-MCNC: 173 UG/DL (ref 250–450)
UNSATURATED IRON BINDING CAPACITY: 133 UG/DL (ref 112–347)
VANCOMYCIN SERPL-MCNC: 18.4 UG/ML
VIT B12 SERPL-MCNC: 1090 PG/ML (ref 232–1245)
WBC OTHER # BLD: 9.5 K/UL (ref 3.5–11.3)

## 2023-10-15 PROCEDURE — 82306 VITAMIN D 25 HYDROXY: CPT

## 2023-10-15 PROCEDURE — 6370000000 HC RX 637 (ALT 250 FOR IP): Performed by: STUDENT IN AN ORGANIZED HEALTH CARE EDUCATION/TRAINING PROGRAM

## 2023-10-15 PROCEDURE — 82947 ASSAY GLUCOSE BLOOD QUANT: CPT

## 2023-10-15 PROCEDURE — 83550 IRON BINDING TEST: CPT

## 2023-10-15 PROCEDURE — 80202 ASSAY OF VANCOMYCIN: CPT

## 2023-10-15 PROCEDURE — 83735 ASSAY OF MAGNESIUM: CPT

## 2023-10-15 PROCEDURE — 6370000000 HC RX 637 (ALT 250 FOR IP): Performed by: NURSE PRACTITIONER

## 2023-10-15 PROCEDURE — 99232 SBSQ HOSP IP/OBS MODERATE 35: CPT | Performed by: STUDENT IN AN ORGANIZED HEALTH CARE EDUCATION/TRAINING PROGRAM

## 2023-10-15 PROCEDURE — 83540 ASSAY OF IRON: CPT

## 2023-10-15 PROCEDURE — 2580000003 HC RX 258: Performed by: NURSE PRACTITIONER

## 2023-10-15 PROCEDURE — 6360000002 HC RX W HCPCS: Performed by: NURSE PRACTITIONER

## 2023-10-15 PROCEDURE — 86140 C-REACTIVE PROTEIN: CPT

## 2023-10-15 PROCEDURE — 2060000000 HC ICU INTERMEDIATE R&B

## 2023-10-15 PROCEDURE — 80048 BASIC METABOLIC PNL TOTAL CA: CPT

## 2023-10-15 PROCEDURE — 36415 COLL VENOUS BLD VENIPUNCTURE: CPT

## 2023-10-15 PROCEDURE — 86900 BLOOD TYPING SEROLOGIC ABO: CPT

## 2023-10-15 PROCEDURE — 86850 RBC ANTIBODY SCREEN: CPT

## 2023-10-15 PROCEDURE — 85730 THROMBOPLASTIN TIME PARTIAL: CPT

## 2023-10-15 PROCEDURE — 82607 VITAMIN B-12: CPT

## 2023-10-15 PROCEDURE — 82728 ASSAY OF FERRITIN: CPT

## 2023-10-15 PROCEDURE — 82746 ASSAY OF FOLIC ACID SERUM: CPT

## 2023-10-15 PROCEDURE — 85025 COMPLETE CBC W/AUTO DIFF WBC: CPT

## 2023-10-15 PROCEDURE — 86901 BLOOD TYPING SEROLOGIC RH(D): CPT

## 2023-10-15 RX ORDER — POTASSIUM CHLORIDE 20 MEQ/1
40 TABLET, EXTENDED RELEASE ORAL PRN
Status: DISCONTINUED | OUTPATIENT
Start: 2023-10-15 | End: 2023-10-20 | Stop reason: HOSPADM

## 2023-10-15 RX ORDER — DOCUSATE SODIUM 100 MG/1
100 CAPSULE, LIQUID FILLED ORAL DAILY
Status: DISCONTINUED | OUTPATIENT
Start: 2023-10-15 | End: 2023-10-20 | Stop reason: HOSPADM

## 2023-10-15 RX ORDER — SODIUM CHLORIDE 9 MG/ML
INJECTION, SOLUTION INTRAVENOUS PRN
Status: DISCONTINUED | OUTPATIENT
Start: 2023-10-15 | End: 2023-10-20 | Stop reason: HOSPADM

## 2023-10-15 RX ORDER — POLYETHYLENE GLYCOL 3350 17 G/17G
17 POWDER, FOR SOLUTION ORAL DAILY PRN
Status: DISCONTINUED | OUTPATIENT
Start: 2023-10-15 | End: 2023-10-20 | Stop reason: HOSPADM

## 2023-10-15 RX ORDER — INSULIN GLARGINE 100 [IU]/ML
15 INJECTION, SOLUTION SUBCUTANEOUS NIGHTLY
Status: DISCONTINUED | OUTPATIENT
Start: 2023-10-15 | End: 2023-10-17

## 2023-10-15 RX ORDER — POTASSIUM CHLORIDE 7.45 MG/ML
10 INJECTION INTRAVENOUS PRN
Status: DISCONTINUED | OUTPATIENT
Start: 2023-10-15 | End: 2023-10-20 | Stop reason: HOSPADM

## 2023-10-15 RX ADMIN — TRAMADOL HYDROCHLORIDE 50 MG: 50 TABLET, COATED ORAL at 18:40

## 2023-10-15 RX ADMIN — ROPINIROLE HYDROCHLORIDE 1 MG: 1 TABLET, FILM COATED ORAL at 14:33

## 2023-10-15 RX ADMIN — INSULIN LISPRO 2 UNITS: 100 INJECTION, SOLUTION INTRAVENOUS; SUBCUTANEOUS at 16:27

## 2023-10-15 RX ADMIN — GABAPENTIN 300 MG: 300 CAPSULE ORAL at 08:41

## 2023-10-15 RX ADMIN — GABAPENTIN 300 MG: 300 CAPSULE ORAL at 20:48

## 2023-10-15 RX ADMIN — METOLAZONE 2.5 MG: 2.5 TABLET ORAL at 08:41

## 2023-10-15 RX ADMIN — POTASSIUM CHLORIDE 40 MEQ: 1500 TABLET, EXTENDED RELEASE ORAL at 16:26

## 2023-10-15 RX ADMIN — VANCOMYCIN HYDROCHLORIDE 1000 MG: 1 INJECTION, POWDER, LYOPHILIZED, FOR SOLUTION INTRAVENOUS at 16:47

## 2023-10-15 RX ADMIN — HEPARIN SODIUM 20 UNITS/KG/HR: 10000 INJECTION, SOLUTION INTRAVENOUS at 20:58

## 2023-10-15 RX ADMIN — ROPINIROLE HYDROCHLORIDE 1 MG: 1 TABLET, FILM COATED ORAL at 08:41

## 2023-10-15 RX ADMIN — TRAMADOL HYDROCHLORIDE 50 MG: 50 TABLET, COATED ORAL at 14:39

## 2023-10-15 RX ADMIN — FERROUS SULFATE TAB EC 325 MG (65 MG FE EQUIVALENT) 325 MG: 325 (65 FE) TABLET DELAYED RESPONSE at 08:41

## 2023-10-15 RX ADMIN — FOLIC ACID 1000 MCG: 1 TABLET ORAL at 10:11

## 2023-10-15 RX ADMIN — GABAPENTIN 300 MG: 300 CAPSULE ORAL at 14:33

## 2023-10-15 RX ADMIN — ATORVASTATIN CALCIUM 10 MG: 10 TABLET, FILM COATED ORAL at 08:41

## 2023-10-15 RX ADMIN — DOCUSATE SODIUM 100 MG: 100 CAPSULE, LIQUID FILLED ORAL at 14:33

## 2023-10-15 RX ADMIN — HEPARIN SODIUM 16 UNITS/KG/HR: 10000 INJECTION, SOLUTION INTRAVENOUS at 02:10

## 2023-10-15 RX ADMIN — INSULIN GLARGINE 15 UNITS: 100 INJECTION, SOLUTION SUBCUTANEOUS at 12:47

## 2023-10-15 RX ADMIN — INSULIN LISPRO 4 UNITS: 100 INJECTION, SOLUTION INTRAVENOUS; SUBCUTANEOUS at 12:46

## 2023-10-15 RX ADMIN — INSULIN LISPRO 2 UNITS: 100 INJECTION, SOLUTION INTRAVENOUS; SUBCUTANEOUS at 08:40

## 2023-10-15 RX ADMIN — TRAMADOL HYDROCHLORIDE 50 MG: 50 TABLET, COATED ORAL at 06:11

## 2023-10-15 RX ADMIN — ROPINIROLE HYDROCHLORIDE 1 MG: 1 TABLET, FILM COATED ORAL at 20:48

## 2023-10-15 ASSESSMENT — ENCOUNTER SYMPTOMS
SHORTNESS OF BREATH: 0
RHINORRHEA: 0
ABDOMINAL DISTENTION: 0
WHEEZING: 0
ABDOMINAL PAIN: 0
BACK PAIN: 0
COUGH: 0

## 2023-10-15 ASSESSMENT — PAIN DESCRIPTION - LOCATION
LOCATION: HIP

## 2023-10-15 ASSESSMENT — PAIN DESCRIPTION - DESCRIPTORS
DESCRIPTORS: ACHING
DESCRIPTORS: DISCOMFORT
DESCRIPTORS: DISCOMFORT

## 2023-10-15 ASSESSMENT — PAIN SCALES - GENERAL
PAINLEVEL_OUTOF10: 5
PAINLEVEL_OUTOF10: 5
PAINLEVEL_OUTOF10: 6
PAINLEVEL_OUTOF10: 5
PAINLEVEL_OUTOF10: 0

## 2023-10-15 ASSESSMENT — PAIN - FUNCTIONAL ASSESSMENT: PAIN_FUNCTIONAL_ASSESSMENT: PREVENTS OR INTERFERES SOME ACTIVE ACTIVITIES AND ADLS

## 2023-10-15 ASSESSMENT — PAIN DESCRIPTION - ORIENTATION
ORIENTATION: LEFT

## 2023-10-15 NOTE — CONSENT
Informed Consent for Blood Component Transfusion Note    I have discussed with the  POA  the rationale for blood component transfusion; its benefits in treating or preventing fatigue, organ damage, or death; and its risk which includes mild transfusion reactions, rare risk of blood borne infection, or more serious but rare reactions. I have discussed the alternatives to transfusion, including the risk and consequences of not receiving transfusion. The  POA  had an opportunity to ask questions and had agreed to proceed with transfusion of blood components if indicated tomorrow during surgery.     Electronically signed by Wisam Schmitt MD on 10/15/23 at 5:51 PM EDT

## 2023-10-15 NOTE — PLAN OF CARE
Problem: Discharge Planning  Goal: Discharge to home or other facility with appropriate resources  10/14/2023 2140 by Adeline Osorio RN  Outcome: Progressing  Flowsheets (Taken 10/14/2023 1925)  Discharge to home or other facility with appropriate resources: Identify barriers to discharge with patient and caregiver  10/14/2023 1047 by Lillian Hernández RN  Outcome: Progressing     Problem: Skin/Tissue Integrity  Goal: Absence of new skin breakdown  Description: 1. Monitor for areas of redness and/or skin breakdown  2. Assess vascular access sites hourly  3. Every 4-6 hours minimum:  Change oxygen saturation probe site  4. Every 4-6 hours:  If on nasal continuous positive airway pressure, respiratory therapy assess nares and determine need for appliance change or resting period.   10/14/2023 2140 by Adeline Osorio RN  Outcome: Progressing  10/14/2023 1047 by Lillian Hernández RN  Outcome: Progressing     Problem: Safety - Adult  Goal: Free from fall injury  10/14/2023 2140 by Adeline Osorio RN  Outcome: Progressing  Flowsheets (Taken 10/14/2023 2000)  Free From Fall Injury: Instruct family/caregiver on patient safety  10/14/2023 1047 by Lillian Hernández RN  Outcome: Progressing     Problem: ABCDS Injury Assessment  Goal: Absence of physical injury  10/14/2023 2140 by Adeline Osorio RN  Outcome: Progressing  Flowsheets (Taken 10/14/2023 2000)  Absence of Physical Injury: Implement safety measures based on patient assessment  10/14/2023 1047 by Lillian Hernández RN  Outcome: Progressing     Problem: Pain  Goal: Verbalizes/displays adequate comfort level or baseline comfort level  10/14/2023 2140 by Adeline Osorio RN  Outcome: Progressing  Flowsheets (Taken 10/14/2023 1915)  Verbalizes/displays adequate comfort level or baseline comfort level: Encourage patient to monitor pain and request assistance  10/14/2023 1047 by Lillian Hernández RN  Outcome: Progressing

## 2023-10-15 NOTE — ANESTHESIA PRE PROCEDURE
Department of Anesthesiology  Preprocedure Note       Name:  Peter Kothari   Age:  80 y.o.  :  1934                                          MRN:  [id number]         Date:  10/15/2023      Surgeon: Brittney Yen):  Brennan Duane A, DO    Procedure: Procedure(s):  LEFT HIP PROSTHETIC EXPLANT WITH IRRIGATION AND DEBRIDEMENT AND IMPLANTATION OF ANTIBIOTIC HIP SPACER - OSTEOREMEDIES, LATERAL ON PEG BOARD, FLAT TATIANA, FLUEROSCOPY, CURRETTES    Medications prior to admission:   Prior to Admission medications    Medication Sig Start Date End Date Taking? Authorizing Provider   apixaban (ELIQUIS) 5 MG TABS tablet Take 1 tablet by mouth 2 times daily   Yes Nakul Cleary MD   lovastatin (MEVACOR) 10 MG tablet Take 1 tablet by mouth nightly   Yes Nakul Cleary MD   metFORMIN (GLUCOPHAGE-XR) 500 MG extended release tablet Take 1 tablet by mouth daily (with breakfast)   Yes Nakul Cleary MD   rOPINIRole (REQUIP) 1 MG tablet Take 1 tablet by mouth 3 times daily   Yes Nakul Cleary MD   folic acid (FOLVITE) 254 MCG tablet Take 2 tablets by mouth daily   Yes Nakul Cleary MD   latanoprost (XALATAN) 0.005 % ophthalmic solution Place 1 drop into both eyes nightly   Yes Nakul Cleary MD   metFORMIN (GLUCOPHAGE) 1000 MG tablet Take 1 tablet by mouth at bedtime   Yes ProviderNakul MD   ferrous sulfate (IRON 325) 325 (65 Fe) MG tablet Take 1 tablet by mouth daily (with breakfast)   Yes Nakul Cleary MD   gabapentin (NEURONTIN) 300 MG capsule Take 1 capsule by mouth 3 times daily. Yes Nakul Cleary MD   lisinopril (PRINIVIL;ZESTRIL) 10 MG tablet Take 1 tablet by mouth daily   Yes Nakul Cleary MD   metOLazone (ZAROXOLYN) 2.5 MG tablet Take 1 tablet by mouth daily   Yes Nakul Cleary MD   traMADol (ULTRAM) 50 MG tablet Take 1 tablet by mouth every 6 hours as needed for Pain.  Max Daily Amount: 200 mg   Yes Nakul Cleary MD       Current

## 2023-10-15 NOTE — PROGRESS NOTES
Conjunctivae normal.      Pupils: Pupils are equal, round, and reactive to light. Neck:      Thyroid: No thyromegaly. Vascular: No JVD. Cardiovascular:      Rate and Rhythm: Normal rate and regular rhythm. Heart sounds: Normal heart sounds. No murmur heard. Pulmonary:      Effort: Pulmonary effort is normal.      Breath sounds: Normal breath sounds. No wheezing. Abdominal:      General: Bowel sounds are normal. There is no distension. Palpations: Abdomen is soft. Tenderness: There is no abdominal tenderness. There is no rebound. Musculoskeletal:         General: Tenderness present. Normal range of motion. Cervical back: Normal range of motion and neck supple. Skin:     Findings: Lesion present. Comments: Dressing in place over surgical site wound. Neurological:      Mental Status: She is alert and oriented to person, place, and time. Cranial Nerves: No cranial nerve deficit. Psychiatric:         Behavior: Behavior normal.         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, initial encounter ([address]) 10/13/2023 Yes    Elevated brain natriuretic peptide (BNP) level 10/14/2023 Yes    Pre-op evaluation 10/14/2023 Yes    Hypertension 10/14/2023 Yes    Diabetes mellitus ([address]) 10/14/2023 Yes    Hypercholesterolemia 10/14/2023 Yes    Atrial flutter ([address]) 10/14/2023 Yes    Hyponatremia 10/14/2023 Yes    Fall 10/14/2023 Yes    Leukocytosis 10/14/2023 Yes    Anemia 10/14/2023 Yes    Hypokalemia 10/14/2023 Yes    Renal insufficiency 10/14/2023 Yes    Closed fracture of neck of left femur ([address]) 10/14/2023 Yes    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   And  soft tissue swelling around left hip   plan for OR on 10/16 for irrigation debridement of left hip wound and possible explanation of hardware. Per orthopedic surgery. Continued on Vanco.  Pain control. Antiemetics. Appreciate ID input. H/o Atrial flutter-currently stable and asymptomatic. Patient is okay to proceed with surgery per cardiology. Continued on heparin drip for anticoagulation. HFpEF- EF 60-65% (1/22). -elevated pro bnp. Clinically stable. Pending ECHO  Hyponatremia/hypochloremia-continue IV fluids  Diabetes mellitus type 2-blood sugar high this morning. We will add Lantus and adjust  insulin sliding scale. Hypoglycemia protocol. POCT glucose checks. Leukocytosis from possible infection of surgical wound site-resolved. Zofadr-qemdob-ow iron studies. On IV Venofer for 3 doses. 7.  Hypertension: Currently normotensive off medications. Continue to monitor. 8. HPL- on home dose of statins. 9.  Hyponatremia, hypokalemia-continued on electrolyte replacements. Continue IV fluids.   PT OT олег.      Kimmie Rust MD  10/15/2023  7:50 AM

## 2023-10-15 NOTE — PLAN OF CARE
Problem: Discharge Planning  Goal: Discharge to home or other facility with appropriate resources  10/15/2023 1057 by Royal Fanny RN  Outcome: Progressing  10/14/2023 2140 by Corinna Montgomery RN  Outcome: Progressing  Flowsheets (Taken 10/14/2023 1925)  Discharge to home or other facility with appropriate resources: Identify barriers to discharge with patient and caregiver     Problem: Skin/Tissue Integrity  Goal: Absence of new skin breakdown  Description: 1. Monitor for areas of redness and/or skin breakdown  2. Assess vascular access sites hourly  3. Every 4-6 hours minimum:  Change oxygen saturation probe site  4. Every 4-6 hours:  If on nasal continuous positive airway pressure, respiratory therapy assess nares and determine need for appliance change or resting period.   10/15/2023 1057 by Royal Fanny RN  Outcome: Progressing  10/14/2023 2140 by Corinna Montgomery RN  Outcome: Progressing     Problem: Safety - Adult  Goal: Free from fall injury  10/15/2023 1057 by Royal Fanny RN  Outcome: Progressing  10/14/2023 2140 by Corinna Montgomery RN  Outcome: Progressing  Flowsheets (Taken 10/14/2023 2000)  Free From Fall Injury: Instruct family/caregiver on patient safety     Problem: ABCDS Injury Assessment  Goal: Absence of physical injury  10/15/2023 1057 by Royal Fanny RN  Outcome: Progressing  10/14/2023 2140 by Corinna Montgomery RN  Outcome: Progressing  Flowsheets (Taken 10/14/2023 2000)  Absence of Physical Injury: Implement safety measures based on patient assessment     Problem: Pain  Goal: Verbalizes/displays adequate comfort level or baseline comfort level  10/15/2023 1057 by Royal Fanny RN  Outcome: Progressing  Flowsheets  Taken 10/15/2023 0345 by Corinna Montgomery RN  Verbalizes/displays adequate comfort level or baseline comfort level: Encourage patient to monitor pain and request assistance  Taken 10/14/2023 2331 by Corinna Montgomery RN  Verbalizes/displays adequate comfort level or baseline comfort level: Encourage patient to monitor pain and request assistance  10/14/2023 2140 by Ricarda Boudreaux RN  Outcome: Progressing  Flowsheets (Taken 10/14/2023 1915)  Verbalizes/displays adequate comfort level or baseline comfort level: Encourage patient to monitor pain and request assistance

## 2023-10-16 ENCOUNTER — APPOINTMENT (OUTPATIENT)
Dept: GENERAL RADIOLOGY | Age: 88
End: 2023-10-16
Attending: STUDENT IN AN ORGANIZED HEALTH CARE EDUCATION/TRAINING PROGRAM
Payer: COMMERCIAL

## 2023-10-16 ENCOUNTER — APPOINTMENT (OUTPATIENT)
Age: 88
End: 2023-10-16
Attending: STUDENT IN AN ORGANIZED HEALTH CARE EDUCATION/TRAINING PROGRAM
Payer: COMMERCIAL

## 2023-10-16 ENCOUNTER — ANESTHESIA (OUTPATIENT)
Dept: OPERATING ROOM | Age: 88
End: 2023-10-16
Payer: COMMERCIAL

## 2023-10-16 ENCOUNTER — APPOINTMENT (OUTPATIENT)
Dept: CT IMAGING | Age: 88
End: 2023-10-16
Attending: STUDENT IN AN ORGANIZED HEALTH CARE EDUCATION/TRAINING PROGRAM
Payer: COMMERCIAL

## 2023-10-16 LAB
25(OH)D3 SERPL-MCNC: 21.6 NG/ML
25(OH)D3 SERPL-MCNC: 22.4 NG/ML (ref 30–100)
ANION GAP SERPL CALCULATED.3IONS-SCNC: 11 MMOL/L (ref 9–17)
BASOPHILS # BLD: 0.04 K/UL (ref 0–0.2)
BASOPHILS NFR BLD: 1 % (ref 0–2)
BUN SERPL-MCNC: 27 MG/DL (ref 8–23)
CALCIUM SERPL-MCNC: 8.7 MG/DL (ref 8.6–10.4)
CHLORIDE SERPL-SCNC: 87 MMOL/L (ref 98–107)
CO2 SERPL-SCNC: 32 MMOL/L (ref 20–31)
CREAT SERPL-MCNC: 0.8 MG/DL (ref 0.5–0.9)
CRP SERPL HS-MCNC: 58.5 MG/L (ref 0–5)
ECHO AO ASC DIAM: 3.2 CM
ECHO AO ASCENDING AORTA INDEX: 1.79 CM/M2
ECHO AO ROOT DIAM: 3.6 CM
ECHO AO ROOT INDEX: 2.01 CM/M2
ECHO AV AREA PEAK VELOCITY: 1.7 CM2
ECHO AV AREA VTI: 1.2 CM2
ECHO AV AREA/BSA PEAK VELOCITY: 0.9 CM2/M2
ECHO AV AREA/BSA VTI: 0.7 CM2/M2
ECHO AV MEAN GRADIENT: 4 MMHG
ECHO AV MEAN VELOCITY: 1 M/S
ECHO AV PEAK GRADIENT: 8 MMHG
ECHO AV PEAK VELOCITY: 1.4 M/S
ECHO AV VELOCITY RATIO: 0.57
ECHO AV VTI: 34.1 CM
ECHO BSA: 1.85 M2
ECHO EST RA PRESSURE: 8 MMHG
ECHO LA DIAMETER INDEX: 1.84 CM/M2
ECHO LA DIAMETER: 3.3 CM
ECHO LA TO AORTIC ROOT RATIO: 0.92
ECHO LV E' LATERAL VELOCITY: 10 CM/S
ECHO LV E' SEPTAL VELOCITY: 7 CM/S
ECHO LV EF PHYSICIAN: 40 %
ECHO LV FRACTIONAL SHORTENING: 35 % (ref 28–44)
ECHO LV INTERNAL DIMENSION DIASTOLE INDEX: 2.57 CM/M2
ECHO LV INTERNAL DIMENSION DIASTOLIC: 4.6 CM (ref 3.9–5.3)
ECHO LV INTERNAL DIMENSION SYSTOLIC INDEX: 1.68 CM/M2
ECHO LV INTERNAL DIMENSION SYSTOLIC: 3 CM
ECHO LV IVSD: 0.9 CM (ref 0.6–0.9)
ECHO LV MASS 2D: 169.9 G (ref 67–162)
ECHO LV MASS INDEX 2D: 94.9 G/M2 (ref 43–95)
ECHO LV POSTERIOR WALL DIASTOLIC: 1.2 CM (ref 0.6–0.9)
ECHO LV RELATIVE WALL THICKNESS RATIO: 0.52
ECHO LVOT AREA: 2.8 CM2
ECHO LVOT AV VTI INDEX: 0.43
ECHO LVOT DIAM: 1.9 CM
ECHO LVOT MEAN GRADIENT: 1 MMHG
ECHO LVOT PEAK GRADIENT: 3 MMHG
ECHO LVOT PEAK VELOCITY: 0.8 M/S
ECHO LVOT STROKE VOLUME INDEX: 23.3 ML/M2
ECHO LVOT SV: 41.7 ML
ECHO LVOT VTI: 14.7 CM
ECHO MV AREA VTI: 1 CM2
ECHO MV LVOT VTI INDEX: 2.92
ECHO MV MAX VELOCITY: 1.7 M/S
ECHO MV MEAN GRADIENT: 2 MMHG
ECHO MV MEAN VELOCITY: 0.6 M/S
ECHO MV PEAK GRADIENT: 11 MMHG
ECHO MV VTI: 42.9 CM
ECHO PV MAX VELOCITY: 1.1 M/S
ECHO PV PEAK GRADIENT: 4 MMHG
ECHO RIGHT VENTRICULAR SYSTOLIC PRESSURE (RVSP): 59 MMHG
ECHO RV FREE WALL PEAK S': 11 CM/S
ECHO RV TAPSE: 1.8 CM (ref 1.7–?)
ECHO TV REGURGITANT MAX VELOCITY: 3.58 M/S
ECHO TV REGURGITANT PEAK GRADIENT: 51 MMHG
EOSINOPHIL # BLD: 0.3 K/UL (ref 0–0.44)
EOSINOPHILS RELATIVE PERCENT: 4 % (ref 1–4)
ERYTHROCYTE [DISTWIDTH] IN BLOOD BY AUTOMATED COUNT: 15.3 % (ref 11.8–14.4)
GFR SERPL CREATININE-BSD FRML MDRD: >60 ML/MIN/1.73M2
GLUCOSE BLD-MCNC: 172 MG/DL (ref 65–105)
GLUCOSE BLD-MCNC: 183 MG/DL (ref 65–105)
GLUCOSE BLD-MCNC: 186 MG/DL (ref 65–105)
GLUCOSE BLD-MCNC: 229 MG/DL (ref 65–105)
GLUCOSE SERPL-MCNC: 197 MG/DL (ref 70–99)
HCT VFR BLD AUTO: 30.1 % (ref 36.3–47.1)
HGB BLD-MCNC: 9.3 G/DL (ref 11.9–15.1)
IMM GRANULOCYTES # BLD AUTO: 0.06 K/UL (ref 0–0.3)
IMM GRANULOCYTES NFR BLD: 1 %
LYMPHOCYTES NFR BLD: 1.04 K/UL (ref 1.1–3.7)
LYMPHOCYTES RELATIVE PERCENT: 12 % (ref 24–43)
MCH RBC QN AUTO: 28.6 PG (ref 25.2–33.5)
MCHC RBC AUTO-ENTMCNC: 30.9 G/DL (ref 28.4–34.8)
MCV RBC AUTO: 92.6 FL (ref 82.6–102.9)
MONOCYTES NFR BLD: 0.88 K/UL (ref 0.1–1.2)
MONOCYTES NFR BLD: 10 % (ref 3–12)
NEUTROPHILS NFR BLD: 72 % (ref 36–65)
NEUTS SEG NFR BLD: 6.31 K/UL (ref 1.5–8.1)
NRBC BLD-RTO: 0 PER 100 WBC
PARTIAL THROMBOPLASTIN TIME: 105.6 SEC (ref 23–36.5)
PARTIAL THROMBOPLASTIN TIME: 42.6 SEC (ref 23–36.5)
PARTIAL THROMBOPLASTIN TIME: 97.1 SEC (ref 23–36.5)
PLATELET # BLD AUTO: 355 K/UL (ref 138–453)
PMV BLD AUTO: 9 FL (ref 8.1–13.5)
POTASSIUM SERPL-SCNC: 4.5 MMOL/L (ref 3.7–5.3)
RBC # BLD AUTO: 3.25 M/UL (ref 3.95–5.11)
RBC # BLD: ABNORMAL 10*6/UL
SODIUM SERPL-SCNC: 130 MMOL/L (ref 135–144)
WBC OTHER # BLD: 8.6 K/UL (ref 3.5–11.3)

## 2023-10-16 PROCEDURE — 2500000003 HC RX 250 WO HCPCS

## 2023-10-16 PROCEDURE — 86140 C-REACTIVE PROTEIN: CPT

## 2023-10-16 PROCEDURE — 87186 SC STD MICRODIL/AGAR DIL: CPT

## 2023-10-16 PROCEDURE — 88304 TISSUE EXAM BY PATHOLOGIST: CPT

## 2023-10-16 PROCEDURE — 3700000000 HC ANESTHESIA ATTENDED CARE: Performed by: ORTHOPAEDIC SURGERY

## 2023-10-16 PROCEDURE — 6360000002 HC RX W HCPCS: Performed by: ANESTHESIOLOGY

## 2023-10-16 PROCEDURE — 6370000000 HC RX 637 (ALT 250 FOR IP): Performed by: NURSE PRACTITIONER

## 2023-10-16 PROCEDURE — 2709999900 HC NON-CHARGEABLE SUPPLY: Performed by: ORTHOPAEDIC SURGERY

## 2023-10-16 PROCEDURE — 87205 SMEAR GRAM STAIN: CPT

## 2023-10-16 PROCEDURE — 3600000014 HC SURGERY LEVEL 4 ADDTL 15MIN: Performed by: ORTHOPAEDIC SURGERY

## 2023-10-16 PROCEDURE — 0SBB0ZZ EXCISION OF LEFT HIP JOINT, OPEN APPROACH: ICD-10-PCS | Performed by: ORTHOPAEDIC SURGERY

## 2023-10-16 PROCEDURE — 36415 COLL VENOUS BLD VENIPUNCTURE: CPT

## 2023-10-16 PROCEDURE — 87102 FUNGUS ISOLATION CULTURE: CPT

## 2023-10-16 PROCEDURE — 2580000003 HC RX 258

## 2023-10-16 PROCEDURE — C1776 JOINT DEVICE (IMPLANTABLE): HCPCS | Performed by: ORTHOPAEDIC SURGERY

## 2023-10-16 PROCEDURE — 82306 VITAMIN D 25 HYDROXY: CPT

## 2023-10-16 PROCEDURE — 6360000002 HC RX W HCPCS: Performed by: NURSE ANESTHETIST, CERTIFIED REGISTERED

## 2023-10-16 PROCEDURE — 99232 SBSQ HOSP IP/OBS MODERATE 35: CPT | Performed by: STUDENT IN AN ORGANIZED HEALTH CARE EDUCATION/TRAINING PROGRAM

## 2023-10-16 PROCEDURE — 2580000003 HC RX 258: Performed by: ORTHOPAEDIC SURGERY

## 2023-10-16 PROCEDURE — 6360000002 HC RX W HCPCS

## 2023-10-16 PROCEDURE — 93306 TTE W/DOPPLER COMPLETE: CPT | Performed by: INTERNAL MEDICINE

## 2023-10-16 PROCEDURE — 93306 TTE W/DOPPLER COMPLETE: CPT

## 2023-10-16 PROCEDURE — 87070 CULTURE OTHR SPECIMN AEROBIC: CPT

## 2023-10-16 PROCEDURE — 85730 THROMBOPLASTIN TIME PARTIAL: CPT

## 2023-10-16 PROCEDURE — 80048 BASIC METABOLIC PNL TOTAL CA: CPT

## 2023-10-16 PROCEDURE — 87206 SMEAR FLUORESCENT/ACID STAI: CPT

## 2023-10-16 PROCEDURE — 2580000003 HC RX 258: Performed by: NURSE ANESTHETIST, CERTIFIED REGISTERED

## 2023-10-16 PROCEDURE — 82947 ASSAY GLUCOSE BLOOD QUANT: CPT

## 2023-10-16 PROCEDURE — C1713 ANCHOR/SCREW BN/BN,TIS/BN: HCPCS | Performed by: ORTHOPAEDIC SURGERY

## 2023-10-16 PROCEDURE — 6370000000 HC RX 637 (ALT 250 FOR IP): Performed by: STUDENT IN AN ORGANIZED HEALTH CARE EDUCATION/TRAINING PROGRAM

## 2023-10-16 PROCEDURE — C1769 GUIDE WIRE: HCPCS | Performed by: ORTHOPAEDIC SURGERY

## 2023-10-16 PROCEDURE — 2060000000 HC ICU INTERMEDIATE R&B

## 2023-10-16 PROCEDURE — 87075 CULTR BACTERIA EXCEPT BLOOD: CPT

## 2023-10-16 PROCEDURE — 7100000000 HC PACU RECOVERY - FIRST 15 MIN: Performed by: ORTHOPAEDIC SURGERY

## 2023-10-16 PROCEDURE — 0SPB0JZ REMOVAL OF SYNTHETIC SUBSTITUTE FROM LEFT HIP JOINT, OPEN APPROACH: ICD-10-PCS | Performed by: ORTHOPAEDIC SURGERY

## 2023-10-16 PROCEDURE — 6360000002 HC RX W HCPCS: Performed by: ORTHOPAEDIC SURGERY

## 2023-10-16 PROCEDURE — 3700000001 HC ADD 15 MINUTES (ANESTHESIA): Performed by: ORTHOPAEDIC SURGERY

## 2023-10-16 PROCEDURE — 7100000001 HC PACU RECOVERY - ADDTL 15 MIN: Performed by: ORTHOPAEDIC SURGERY

## 2023-10-16 PROCEDURE — 2500000003 HC RX 250 WO HCPCS: Performed by: NURSE ANESTHETIST, CERTIFIED REGISTERED

## 2023-10-16 PROCEDURE — 85025 COMPLETE CBC W/AUTO DIFF WBC: CPT

## 2023-10-16 PROCEDURE — 0SRB0EZ REPLACEMENT OF LEFT HIP JOINT WITH ARTICULATING SPACER, OPEN APPROACH: ICD-10-PCS | Performed by: ORTHOPAEDIC SURGERY

## 2023-10-16 PROCEDURE — 6360000002 HC RX W HCPCS: Performed by: CHIROPRACTOR

## 2023-10-16 PROCEDURE — 76376 3D RENDER W/INTRP POSTPROCES: CPT

## 2023-10-16 PROCEDURE — 87077 CULTURE AEROBIC IDENTIFY: CPT

## 2023-10-16 PROCEDURE — 3600000004 HC SURGERY LEVEL 4 BASE: Performed by: ORTHOPAEDIC SURGERY

## 2023-10-16 DEVICE — STIMULAN® RAPID CURE PROVIDED STERILE FOR SINGLE PATIENT USE. STIMULAN® RAPID CURE CONTAINS CALCIUM SULFATE POWDER AND MIXING SOLUTION IN PRE-MEASURED QUANTITIES SO THAT WHEN MIXED TOGETHER IN A STERILE MIXING BOWL, THE RESULTANT PASTE IS TO BE DIGITALLY PACKED INTO OPEN BONE VOID/GAP TO SET INSITU OR PLACED INTO THE MOULD PROVIDED, THE MIXTURE SETS TO FORM BEADS. THE BIODEGRADABLE, RADIOPAQUE BEADS ARE RESORBED IN APPROXIMATELY 30 – 60 DAYS WHEN USED IN ACCORDANCE WITH THE DEVICE LABELLING. STIMULAN® RAPID CURE IS MANUFACTURED FROM SYNTHETIC IMPLANT GRADE CALCIUM SULFATE DIHYDRATE(CASO4.2H2O) THAT RESORBS AND IS REPLACED WITH BONE DURING THE HEALING PROCESS. ALSO, AS THE BONE VOID FILLER BEADS ARE BIODEGRADABLE AND BIOCOMPATIBLE, THEY MAY BE USED AT AN INFECTED SITE.
Type: IMPLANTABLE DEVICE | Site: HIP | Status: FUNCTIONAL
Brand: STIMULAN® RAPID CURE

## 2023-10-16 DEVICE — HEAD ACET DIA46MM 0.9GM GENTMYCN BASE MOD REMEDY: Type: IMPLANTABLE DEVICE | Site: FEMUR | Status: FUNCTIONAL

## 2023-10-16 DEVICE — SET CBL SL SM DIA2MM VIT BEAD DALL-M: Type: IMPLANTABLE DEVICE | Site: FEMUR | Status: FUNCTIONAL

## 2023-10-16 DEVICE — CUP ACET OD54MM ID46MM GENTMYCN BASE REMEDY: Type: IMPLANTABLE DEVICE | Site: FEMUR | Status: FUNCTIONAL

## 2023-10-16 DEVICE — CEMENT BONE 40GM HI VISC PALACOS R: Type: IMPLANTABLE DEVICE | Site: FEMUR | Status: FUNCTIONAL

## 2023-10-16 DEVICE — IMPLANTABLE DEVICE: Type: IMPLANTABLE DEVICE | Site: FEMUR | Status: FUNCTIONAL

## 2023-10-16 RX ORDER — ERGOCALCIFEROL 1.25 MG/1
50000 CAPSULE ORAL WEEKLY
Status: DISCONTINUED | OUTPATIENT
Start: 2023-10-16 | End: 2023-10-20 | Stop reason: HOSPADM

## 2023-10-16 RX ORDER — SODIUM CHLORIDE 0.9 % (FLUSH) 0.9 %
5-40 SYRINGE (ML) INJECTION EVERY 12 HOURS SCHEDULED
Status: DISCONTINUED | OUTPATIENT
Start: 2023-10-16 | End: 2023-10-17 | Stop reason: HOSPADM

## 2023-10-16 RX ORDER — PROCHLORPERAZINE EDISYLATE 5 MG/ML
5 INJECTION INTRAMUSCULAR; INTRAVENOUS
Status: DISCONTINUED | OUTPATIENT
Start: 2023-10-16 | End: 2023-10-17 | Stop reason: HOSPADM

## 2023-10-16 RX ORDER — PHENYLEPHRINE HCL IN 0.9% NACL 1 MG/10 ML
SYRINGE (ML) INTRAVENOUS PRN
Status: DISCONTINUED | OUTPATIENT
Start: 2023-10-16 | End: 2023-10-16 | Stop reason: SDUPTHER

## 2023-10-16 RX ORDER — PROPOFOL 10 MG/ML
INJECTION, EMULSION INTRAVENOUS PRN
Status: DISCONTINUED | OUTPATIENT
Start: 2023-10-16 | End: 2023-10-16 | Stop reason: SDUPTHER

## 2023-10-16 RX ORDER — MAGNESIUM HYDROXIDE 1200 MG/15ML
LIQUID ORAL CONTINUOUS PRN
Status: COMPLETED | OUTPATIENT
Start: 2023-10-16 | End: 2023-10-16

## 2023-10-16 RX ORDER — DIPHENHYDRAMINE HYDROCHLORIDE 50 MG/ML
12.5 INJECTION INTRAMUSCULAR; INTRAVENOUS
Status: DISCONTINUED | OUTPATIENT
Start: 2023-10-16 | End: 2023-10-17 | Stop reason: HOSPADM

## 2023-10-16 RX ORDER — ONDANSETRON 2 MG/ML
INJECTION INTRAMUSCULAR; INTRAVENOUS PRN
Status: DISCONTINUED | OUTPATIENT
Start: 2023-10-16 | End: 2023-10-16 | Stop reason: SDUPTHER

## 2023-10-16 RX ORDER — VANCOMYCIN HYDROCHLORIDE 1 G/20ML
INJECTION, POWDER, LYOPHILIZED, FOR SOLUTION INTRAVENOUS PRN
Status: DISCONTINUED | OUTPATIENT
Start: 2023-10-16 | End: 2023-10-16 | Stop reason: SDUPTHER

## 2023-10-16 RX ORDER — ERGOCALCIFEROL 1.25 MG/1
50000 CAPSULE ORAL WEEKLY
Qty: 8 CAPSULE | Refills: 1 | Status: SHIPPED | OUTPATIENT
Start: 2023-10-16

## 2023-10-16 RX ORDER — DROPERIDOL 2.5 MG/ML
0.62 INJECTION, SOLUTION INTRAMUSCULAR; INTRAVENOUS
Status: DISCONTINUED | OUTPATIENT
Start: 2023-10-16 | End: 2023-10-17 | Stop reason: HOSPADM

## 2023-10-16 RX ORDER — SODIUM CHLORIDE, SODIUM LACTATE, POTASSIUM CHLORIDE, CALCIUM CHLORIDE 600; 310; 30; 20 MG/100ML; MG/100ML; MG/100ML; MG/100ML
INJECTION, SOLUTION INTRAVENOUS CONTINUOUS PRN
Status: DISCONTINUED | OUTPATIENT
Start: 2023-10-16 | End: 2023-10-16 | Stop reason: SDUPTHER

## 2023-10-16 RX ORDER — FENTANYL CITRATE 50 UG/ML
INJECTION, SOLUTION INTRAMUSCULAR; INTRAVENOUS PRN
Status: DISCONTINUED | OUTPATIENT
Start: 2023-10-16 | End: 2023-10-16 | Stop reason: SDUPTHER

## 2023-10-16 RX ORDER — ERGOCALCIFEROL 1.25 MG/1
50000 CAPSULE ORAL WEEKLY
Qty: 8 CAPSULE | Refills: 1 | Status: SHIPPED | OUTPATIENT
Start: 2023-10-16 | End: 2023-10-20 | Stop reason: HOSPADM

## 2023-10-16 RX ORDER — MIDAZOLAM HYDROCHLORIDE 2 MG/2ML
2 INJECTION, SOLUTION INTRAMUSCULAR; INTRAVENOUS
Status: COMPLETED | OUTPATIENT
Start: 2023-10-16 | End: 2023-10-16

## 2023-10-16 RX ORDER — LABETALOL HYDROCHLORIDE 5 MG/ML
10 INJECTION, SOLUTION INTRAVENOUS
Status: DISCONTINUED | OUTPATIENT
Start: 2023-10-16 | End: 2023-10-17 | Stop reason: HOSPADM

## 2023-10-16 RX ORDER — TRANEXAMIC ACID 10 MG/ML
INJECTION, SOLUTION INTRAVENOUS PRN
Status: DISCONTINUED | OUTPATIENT
Start: 2023-10-16 | End: 2023-10-16 | Stop reason: SDUPTHER

## 2023-10-16 RX ORDER — LIDOCAINE HYDROCHLORIDE 10 MG/ML
INJECTION, SOLUTION EPIDURAL; INFILTRATION; INTRACAUDAL; PERINEURAL PRN
Status: DISCONTINUED | OUTPATIENT
Start: 2023-10-16 | End: 2023-10-16 | Stop reason: SDUPTHER

## 2023-10-16 RX ORDER — SODIUM CHLORIDE 9 MG/ML
INJECTION, SOLUTION INTRAVENOUS CONTINUOUS PRN
Status: DISCONTINUED | OUTPATIENT
Start: 2023-10-16 | End: 2023-10-16 | Stop reason: SDUPTHER

## 2023-10-16 RX ORDER — VANCOMYCIN HYDROCHLORIDE 1 G/20ML
INJECTION, POWDER, LYOPHILIZED, FOR SOLUTION INTRAVENOUS PRN
Status: DISCONTINUED | OUTPATIENT
Start: 2023-10-16 | End: 2023-10-16 | Stop reason: ALTCHOICE

## 2023-10-16 RX ORDER — GENTAMICIN SULFATE 40 MG/ML
INJECTION, SOLUTION INTRAMUSCULAR; INTRAVENOUS PRN
Status: DISCONTINUED | OUTPATIENT
Start: 2023-10-16 | End: 2023-10-16 | Stop reason: ALTCHOICE

## 2023-10-16 RX ORDER — SODIUM CHLORIDE 9 MG/ML
INJECTION, SOLUTION INTRAVENOUS PRN
Status: DISCONTINUED | OUTPATIENT
Start: 2023-10-16 | End: 2023-10-17 | Stop reason: HOSPADM

## 2023-10-16 RX ORDER — FENTANYL CITRATE 50 UG/ML
25 INJECTION, SOLUTION INTRAMUSCULAR; INTRAVENOUS EVERY 5 MIN PRN
Status: DISCONTINUED | OUTPATIENT
Start: 2023-10-16 | End: 2023-10-17 | Stop reason: HOSPADM

## 2023-10-16 RX ORDER — GLYCOPYRROLATE 1 MG/5 ML
SYRINGE (ML) INTRAVENOUS PRN
Status: DISCONTINUED | OUTPATIENT
Start: 2023-10-16 | End: 2023-10-16 | Stop reason: SDUPTHER

## 2023-10-16 RX ORDER — ROCURONIUM BROMIDE 10 MG/ML
INJECTION, SOLUTION INTRAVENOUS PRN
Status: DISCONTINUED | OUTPATIENT
Start: 2023-10-16 | End: 2023-10-16 | Stop reason: SDUPTHER

## 2023-10-16 RX ORDER — SODIUM CHLORIDE 0.9 % (FLUSH) 0.9 %
5-40 SYRINGE (ML) INJECTION PRN
Status: DISCONTINUED | OUTPATIENT
Start: 2023-10-16 | End: 2023-10-17 | Stop reason: HOSPADM

## 2023-10-16 RX ORDER — DEXAMETHASONE SODIUM PHOSPHATE 10 MG/ML
INJECTION INTRAMUSCULAR; INTRAVENOUS PRN
Status: DISCONTINUED | OUTPATIENT
Start: 2023-10-16 | End: 2023-10-16 | Stop reason: SDUPTHER

## 2023-10-16 RX ADMIN — FENTANYL CITRATE 50 MCG: 50 INJECTION, SOLUTION INTRAMUSCULAR; INTRAVENOUS at 21:43

## 2023-10-16 RX ADMIN — VANCOMYCIN HYDROCHLORIDE 1000 MG: 1 INJECTION, POWDER, LYOPHILIZED, FOR SOLUTION INTRAVENOUS at 17:43

## 2023-10-16 RX ADMIN — ROCURONIUM BROMIDE 20 MG: 10 INJECTION, SOLUTION INTRAVENOUS at 18:52

## 2023-10-16 RX ADMIN — TRAMADOL HYDROCHLORIDE 50 MG: 50 TABLET, COATED ORAL at 04:27

## 2023-10-16 RX ADMIN — PROPOFOL 100 MG: 10 INJECTION, EMULSION INTRAVENOUS at 16:53

## 2023-10-16 RX ADMIN — FENTANYL CITRATE 25 MCG: 50 INJECTION, SOLUTION INTRAMUSCULAR; INTRAVENOUS at 19:17

## 2023-10-16 RX ADMIN — Medication 50 MCG: at 21:03

## 2023-10-16 RX ADMIN — ERGOCALCIFEROL 50000 UNITS: 1.25 CAPSULE ORAL at 11:52

## 2023-10-16 RX ADMIN — HYDROMORPHONE HYDROCHLORIDE 0.5 MG: 1 INJECTION, SOLUTION INTRAMUSCULAR; INTRAVENOUS; SUBCUTANEOUS at 23:01

## 2023-10-16 RX ADMIN — METOLAZONE 2.5 MG: 2.5 TABLET ORAL at 11:44

## 2023-10-16 RX ADMIN — ROCURONIUM BROMIDE 30 MG: 10 INJECTION, SOLUTION INTRAVENOUS at 17:33

## 2023-10-16 RX ADMIN — TRANEXAMIC ACID 1 G: 10 INJECTION, SOLUTION INTRAVENOUS at 17:45

## 2023-10-16 RX ADMIN — Medication 50 MCG: at 19:08

## 2023-10-16 RX ADMIN — TRANEXAMIC ACID 1 G: 10 INJECTION, SOLUTION INTRAVENOUS at 21:46

## 2023-10-16 RX ADMIN — PROPOFOL 100 MG: 10 INJECTION, EMULSION INTRAVENOUS at 22:24

## 2023-10-16 RX ADMIN — FENTANYL CITRATE 25 MCG: 50 INJECTION, SOLUTION INTRAMUSCULAR; INTRAVENOUS at 23:15

## 2023-10-16 RX ADMIN — Medication 2000 MG: at 01:08

## 2023-10-16 RX ADMIN — SODIUM CHLORIDE, SODIUM LACTATE, POTASSIUM CHLORIDE, CALCIUM CHLORIDE: 600; 310; 30; 20 INJECTION, SOLUTION INTRAVENOUS at 16:59

## 2023-10-16 RX ADMIN — FENTANYL CITRATE 25 MCG: 50 INJECTION, SOLUTION INTRAMUSCULAR; INTRAVENOUS at 17:50

## 2023-10-16 RX ADMIN — GABAPENTIN 300 MG: 300 CAPSULE ORAL at 11:45

## 2023-10-16 RX ADMIN — FENTANYL CITRATE 100 MCG: 50 INJECTION, SOLUTION INTRAMUSCULAR; INTRAVENOUS at 16:53

## 2023-10-16 RX ADMIN — ONDANSETRON 4 MG: 2 INJECTION INTRAMUSCULAR; INTRAVENOUS at 21:42

## 2023-10-16 RX ADMIN — ROCURONIUM BROMIDE 30 MG: 10 INJECTION, SOLUTION INTRAVENOUS at 16:53

## 2023-10-16 RX ADMIN — SUGAMMADEX 200 MG: 100 INJECTION, SOLUTION INTRAVENOUS at 22:29

## 2023-10-16 RX ADMIN — ROPINIROLE HYDROCHLORIDE 1 MG: 1 TABLET, FILM COATED ORAL at 11:44

## 2023-10-16 RX ADMIN — SODIUM CHLORIDE, POTASSIUM CHLORIDE, SODIUM LACTATE AND CALCIUM CHLORIDE: 600; 310; 30; 20 INJECTION, SOLUTION INTRAVENOUS at 16:48

## 2023-10-16 RX ADMIN — Medication 0.2 MG: at 17:46

## 2023-10-16 RX ADMIN — LIDOCAINE HYDROCHLORIDE 50 MG: 10 INJECTION, SOLUTION EPIDURAL; INFILTRATION; INTRACAUDAL; PERINEURAL at 16:53

## 2023-10-16 RX ADMIN — DEXAMETHASONE SODIUM PHOSPHATE 10 MG: 10 INJECTION INTRAMUSCULAR; INTRAVENOUS at 19:05

## 2023-10-16 RX ADMIN — ROCURONIUM BROMIDE 20 MG: 10 INJECTION, SOLUTION INTRAVENOUS at 17:04

## 2023-10-16 RX ADMIN — SODIUM CHLORIDE: 9 INJECTION, SOLUTION INTRAVENOUS at 19:34

## 2023-10-16 RX ADMIN — Medication 100 MCG: at 16:57

## 2023-10-16 RX ADMIN — Medication 100 MCG: at 17:16

## 2023-10-16 RX ADMIN — MIDAZOLAM HYDROCHLORIDE 1 MG: 1 INJECTION, SOLUTION INTRAMUSCULAR; INTRAVENOUS at 23:35

## 2023-10-16 ASSESSMENT — ENCOUNTER SYMPTOMS
COUGH: 0
SHORTNESS OF BREATH: 0
BACK PAIN: 0
ABDOMINAL PAIN: 0
ABDOMINAL DISTENTION: 0
RHINORRHEA: 0
WHEEZING: 0

## 2023-10-16 ASSESSMENT — PAIN DESCRIPTION - ORIENTATION
ORIENTATION: LEFT
ORIENTATION: LEFT

## 2023-10-16 ASSESSMENT — PAIN SCALES - GENERAL
PAINLEVEL_OUTOF10: 5
PAINLEVEL_OUTOF10: 10
PAINLEVEL_OUTOF10: 6

## 2023-10-16 ASSESSMENT — PATIENT HEALTH QUESTIONNAIRE - PHQ9
SUM OF ALL RESPONSES TO PHQ QUESTIONS 1-9: 0
2. FEELING DOWN, DEPRESSED OR HOPELESS: 0
SUM OF ALL RESPONSES TO PHQ QUESTIONS 1-9: 0
SUM OF ALL RESPONSES TO PHQ9 QUESTIONS 1 & 2: 0
1. LITTLE INTEREST OR PLEASURE IN DOING THINGS: 0

## 2023-10-16 ASSESSMENT — COPD QUESTIONNAIRES: CAT_SEVERITY: MODERATE

## 2023-10-16 ASSESSMENT — PAIN DESCRIPTION - DESCRIPTORS
DESCRIPTORS: BURNING
DESCRIPTORS: BURNING

## 2023-10-16 ASSESSMENT — PAIN DESCRIPTION - LOCATION
LOCATION: LEG
LOCATION: LEG

## 2023-10-16 ASSESSMENT — PAIN - FUNCTIONAL ASSESSMENT: PAIN_FUNCTIONAL_ASSESSMENT: 0-10

## 2023-10-16 NOTE — PROGRESS NOTES
Orthopedic Progress Note    Patient:  Lukasz Mata  YOB: 1934     80 y.o. female    Subjective:  Patient seen and examined. Patient is hard of hearing. No complaints or concerns. No issue overnight. Pain controlled. Denies fever, HA, CP, SOB, N/V, dysuria. ID will provide recommendations pending intraop cultures from today. Vitals reviewed, afebrile    Objective:   Vitals:    10/16/23 0341   BP:    Pulse: (!) 49   Resp: 18   Temp:    SpO2:      Gen: NAD, cooperative    Cardiovascular: Bradycardic rate  Respiratory: No acute respiratory distress    MSK:  LLE: Soft dressing overlying left hip wound. Compartments soft and easily compressible. EHL/FHL/TA/GS complex motor intact. Sural/saphenous/SPN/DPN/plantar nerve distribution SILT. The foot is warm and well-perfused with BCR. Recent Labs     10/14/23  0725 10/15/23  0800 10/16/23  0430   WBC 12.5* 9.5 8.6   HGB 9.4* 9.4* 9.3*   HCT 30.1* 31.4* 30.1*    386 355   INR 1.8  --   --    * 128*  --    K 3.7 3.5*  --    BUN 33* 29*  --    CREATININE 1.0* 1.0*  --    GLUCOSE 197* 201*  --       Meds:  DVT ppx: Please hold till POD#1  See rec for complete list    Impression 80 y.o. female being seen for:    Left periprosthetic proximal femur fracture with loosening of femoral component    Plan  - Plan for OR today, 10/16/2023, with Dr. Sarahy Fitzpatrick   - Son is POA, consent in chart  - WB status: Nonweightbearing to the left lower extremity  - Antibiotics: We will plan for vancomycin once cultures taken intraoperatively. - DVT ppx:  Please hold chemical anticoagulation till POD#1  - Diet: NPO  -  Soft dressings left hip. Okay for reinforcement from a nursing standpoint.  - Multimodal pain regimen.   - Strict Ice to reduce swelling and pain  -Most recent hemoglobin 9.3  - Encourage Incentive Spirometry use  - Please page Orthopedic Surgery Resident on call with any questions/concerns      Radha Jones, DO  Orthopedic Surgery

## 2023-10-16 NOTE — PLAN OF CARE
Problem: Discharge Planning  Goal: Discharge to home or other facility with appropriate resources  10/16/2023 1552 by Zion Saenz RN  Outcome: Progressing  10/16/2023 0452 by Maya Madera RN  Outcome: Progressing     Problem: Skin/Tissue Integrity  Goal: Absence of new skin breakdown  Description: 1. Monitor for areas of redness and/or skin breakdown  2. Assess vascular access sites hourly  3. Every 4-6 hours minimum:  Change oxygen saturation probe site  4. Every 4-6 hours:  If on nasal continuous positive airway pressure, respiratory therapy assess nares and determine need for appliance change or resting period.   10/16/2023 1552 by Zion Saenz RN  Outcome: Progressing  10/16/2023 0452 by Maya Madera RN  Outcome: Progressing     Problem: Safety - Adult  Goal: Free from fall injury  10/16/2023 1552 by Zion Saenz RN  Outcome: Progressing  10/16/2023 0452 by Maya Madera RN  Outcome: Progressing     Problem: ABCDS Injury Assessment  Goal: Absence of physical injury  10/16/2023 1552 by Zion Saenz RN  Outcome: Progressing  10/16/2023 0452 by Maya Madera RN  Outcome: Progressing     Problem: Pain  Goal: Verbalizes/displays adequate comfort level or baseline comfort level  10/16/2023 1552 by Zion Saenz RN  Outcome: Progressing  10/16/2023 0452 by Maya Madera RN  Outcome: Progressing     Problem: Chronic Conditions and Co-morbidities  Goal: Patient's chronic conditions and co-morbidity symptoms are monitored and maintained or improved  Outcome: Progressing

## 2023-10-16 NOTE — PROGRESS NOTES
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  Osteoporosis vs subacute infection  Periprosthetic fracture Lf proximal femur  Advanced age  DM type 2  HTN  Hypercholesterolemia  Atrial flutter, on anticoagulation  DELL  Elevated CRP    Recommendations:   Blood cultures 10/14/23 negative thus far  Vancomycin pending cultures at time of surgery on 10-16-23  MRSA screen     Medical Decision Making/Summary/Discussion:10/16/2023       Infection Control Recommendations   [place] Precautions    Antimicrobial Stewardship Recommendations     Simplification of therapy  Targeted therapy  PK dosing    Coordination of Outpatient Care:   Estimated Length of IV antimicrobials:TBD  Patient will need Midline Catheter Insertion: TBD  Patient will need PICC line Insertion:TBD  Patient will need: Home IV , [address] [place],  SNF,  LTAC: TBD  Patient will need outpatient wound care:    Chief complaint/reason for consultation:   Concern with prosthetic Lt hip infection      History of Present Illness:   Mason Hoyos is a 80y.o.-year-old  female who was initially admitted on 10/13/2023. Patient seen at the request of Brenda Zheng. INITIAL HISTORY:    Elderly patient who resides in an assisted living facility. She apparently fell twice in the last 2 days and has been unable to bear weight on her left leg since her last fall. She was taken to [facility] where X rays showed a non displaced intertrochanteric fracture. In addition she was found to have erythema of the left hip area, leukocytosis. She was started on Zosyn. Patient was transferred to [facility] for Orthopedic care. At [facility] the fracture was confirmed.  The review of the X rays suggested loosening of the femoral component of the prosthetic hip raising the concern for an underlying prosthetic hip 10/14/23 0739    Order Status: Completed Specimen: Blood Updated: 10/16/23 0808     Specimen Description . BLOOD     Special Requests LEFT HAND 6ML     Culture NO GROWTH 2 DAYS    Culture, Blood 1 [[id number]] Collected: 10/14/23 0725    Order Status: Completed Specimen: Blood Updated: 10/16/23 0809     Specimen Description . BLOOD     Special Requests RIGHT FOREARM 3.5ML     Culture NO GROWTH 2 DAYS              Medical Decision Making-Other:     Note:    Laura Arriaza MD  Internal Medicine Resident, PGY-1  [place], [place]  10/16/2023, 9:19 AM          ATTESTATION:    I have discussed the case, including pertinent history and exam findings with the medical resident. I have evaluated the  History, physical findings and pictures of the patient and the key elements of the encounter have been performed by me. I have reviewed the laboratory data, other diagnostic studies and discussed them with the medical resident. I have updated the medical record where necessary. I agree with the assessment, plan and orders as documented by the medical resident and I have modified them as necessary. Elements of Medical Decision Making:  Note: I have independently performed the steps listed below as part of the medical decision making and evaluation. Examined and discussed with patient. Labs, medications, radiologic studies were reviewed with personal review of films  Large amounts of data were reviewed  Discussed with nursing Staff, Discharge planner  Infection Control and Prevention measures reviewed  All prior entries were reviewed  Administer medications as ordered  Prognosis: Guarded  Discharge planning reviewed  Follow up as outpatient.     Jessica Deleon MD.

## 2023-10-16 NOTE — CARE COORDINATION
SBIRT-  Met with pt with family present with consent. Pt states she does not use drugs or drink alcohol  Pt denies having any depression or SI  Screenings were negative. Alcohol Screening and Brief Intervention        No results for input(s): \"ALC\" in the last 72 hours. Alcohol Pre-screening     (WOMEN ONLY) How many times in the past year have you had 4 or more drinks in a day?: None       Drug Pre-Screening   How many times in the past year have you used a recreational drug or used a prescription medication for nonmedical reasons?: None    Drug Screening DAST       Mood Pre-Screening (PHQ-2)  PHQ-2 Score: 0      I have interviewed Alyssa Gallegos, [id number] regarding  Her alcohol consumption/drug use and risk for excessive use. Screenings were negative. Patient  N/A intervention at this time.      Deferred []    Completed on: 10/16/2023   YVETTE CAMILO

## 2023-10-16 NOTE — PROGRESS NOTES
[facility]  Office: [phone]  Wong Austin DO, Jelly Kent DO, Davon Drake DO, Lola Landis DO, Leslie Fritz MD, Jayce Dubose MD, Kenya Valente MD, Julee Leger MD,  Courtney Contreras MD, Andreia Cole MD, Cheng Madrigal, DO, Darien Rangel MD,  Ilya Avilez MD, Jenifer Medeiros MD, Charlesetta Burkitt, DO, Kb Jacobson MD,  Tim Becerril DO, Ellen Swain MD, Jay Jay Ceballos MD, Valentin Basurto MD, Karmen Pinzon MD,  Yong Bamberger, MD, Jalil Britton MD, Yamini Hull MD, Bucky Escobar MD, Gissel Palacios DO, Domitila Hills MD,  Troy Edwards MD, Don Castañeda MD, Aguilar Celeste, CNP,  Quoc Becerra, CNP,, Arline Mejia, CNP,  Henrique Tolliver, DNP, Yeimy Da Silva, CNP, Lizet Carson, CNP, Nikki Damon, CNP, Ragena Rubinstein, CNP, Melba Barton, CNP, Venkat Velázquez, CNP, Brent Rowe, CNS, Clayton Brown, CNP, Jessica Ashraf, [address]    Progress Note    10/16/2023    7:57 AM    Name:   Génesis Groves  MRN:     [id number]     Acct:      [de-identified]   Room:   [address] [place] [address] Day:  3  Admit Date:  10/13/2023  9:05 PM    PCP:   No primary care provider on file. Code Status:  DNR-CCA    Subjective:     C/C: No chief complaint on file. Interval History Status: Stable. Pt seen and examined. Blood pressure little high this morning. .  She denies any current complaints. Echo done showed EF of 40% with anteroseptal and inferior septal hypokinesis and abnormal diastolic function. Mild TR and MR. .   plan for OR per orthopedic surgery today. .    Brief History:     Génesis Groves is a 80 y.o. Non- / non  female who presents with No chief complaint on file. and is admitted to the hospital for the management of Periprosthetic fracture around internal prosthetic left hip joint, initial encounter ([address]).      75-year-old male with past medical history of diabetes mellitus,

## 2023-10-17 ENCOUNTER — APPOINTMENT (OUTPATIENT)
Dept: GENERAL RADIOLOGY | Age: 88
End: 2023-10-17
Attending: STUDENT IN AN ORGANIZED HEALTH CARE EDUCATION/TRAINING PROGRAM
Payer: COMMERCIAL

## 2023-10-17 LAB
ANION GAP SERPL CALCULATED.3IONS-SCNC: 11 MMOL/L (ref 9–17)
ANION GAP SERPL CALCULATED.3IONS-SCNC: 12 MMOL/L (ref 9–17)
BASOPHILS # BLD: <0.03 K/UL (ref 0–0.2)
BASOPHILS NFR BLD: 0 % (ref 0–2)
BUN SERPL-MCNC: 34 MG/DL (ref 8–23)
BUN SERPL-MCNC: 37 MG/DL (ref 8–23)
CALCIUM SERPL-MCNC: 8.7 MG/DL (ref 8.6–10.4)
CALCIUM SERPL-MCNC: 8.8 MG/DL (ref 8.6–10.4)
CHLORIDE SERPL-SCNC: 83 MMOL/L (ref 98–107)
CHLORIDE SERPL-SCNC: 87 MMOL/L (ref 98–107)
CO2 SERPL-SCNC: 26 MMOL/L (ref 20–31)
CO2 SERPL-SCNC: 28 MMOL/L (ref 20–31)
CREAT SERPL-MCNC: 1 MG/DL (ref 0.5–0.9)
CREAT SERPL-MCNC: 1.1 MG/DL (ref 0.5–0.9)
EKG ATRIAL RATE: 241 BPM
EKG Q-T INTERVAL: 394 MS
EKG QRS DURATION: 68 MS
EKG QTC CALCULATION (BAZETT): 376 MS
EKG R AXIS: 41 DEGREES
EKG T AXIS: 20 DEGREES
EKG VENTRICULAR RATE: 55 BPM
EOSINOPHIL # BLD: <0.03 K/UL (ref 0–0.44)
EOSINOPHILS RELATIVE PERCENT: 0 % (ref 1–4)
ERYTHROCYTE [DISTWIDTH] IN BLOOD BY AUTOMATED COUNT: 15.1 % (ref 11.8–14.4)
GFR SERPL CREATININE-BSD FRML MDRD: 48 ML/MIN/1.73M2
GFR SERPL CREATININE-BSD FRML MDRD: 54 ML/MIN/1.73M2
GLUCOSE BLD-MCNC: 231 MG/DL (ref 65–105)
GLUCOSE BLD-MCNC: 255 MG/DL (ref 65–105)
GLUCOSE BLD-MCNC: 287 MG/DL (ref 65–105)
GLUCOSE BLD-MCNC: 299 MG/DL (ref 65–105)
GLUCOSE SERPL-MCNC: 275 MG/DL (ref 70–99)
GLUCOSE SERPL-MCNC: 303 MG/DL (ref 70–99)
HCT VFR BLD AUTO: 28.8 % (ref 36.3–47.1)
HGB BLD-MCNC: 8.6 G/DL (ref 11.9–15.1)
IMM GRANULOCYTES # BLD AUTO: 0.09 K/UL (ref 0–0.3)
IMM GRANULOCYTES NFR BLD: 1 %
LYMPHOCYTES NFR BLD: 0.56 K/UL (ref 1.1–3.7)
LYMPHOCYTES RELATIVE PERCENT: 5 % (ref 24–43)
MCH RBC QN AUTO: 27.9 PG (ref 25.2–33.5)
MCHC RBC AUTO-ENTMCNC: 29.9 G/DL (ref 28.4–34.8)
MCV RBC AUTO: 93.5 FL (ref 82.6–102.9)
MICROORGANISM SPEC CULT: NORMAL
MICROORGANISM/AGENT SPEC: NORMAL
MONOCYTES NFR BLD: 0.28 K/UL (ref 0.1–1.2)
MONOCYTES NFR BLD: 3 % (ref 3–12)
NEUTROPHILS NFR BLD: 92 % (ref 36–65)
NEUTS SEG NFR BLD: 10.33 K/UL (ref 1.5–8.1)
NRBC BLD-RTO: 0 PER 100 WBC
PLATELET # BLD AUTO: 348 K/UL (ref 138–453)
PMV BLD AUTO: 9.5 FL (ref 8.1–13.5)
POTASSIUM SERPL-SCNC: 5.1 MMOL/L (ref 3.7–5.3)
POTASSIUM SERPL-SCNC: 5.5 MMOL/L (ref 3.7–5.3)
POTASSIUM SERPL-SCNC: 5.6 MMOL/L (ref 3.7–5.3)
RBC # BLD AUTO: 3.08 M/UL (ref 3.95–5.11)
SODIUM SERPL-SCNC: 120 MMOL/L (ref 135–144)
SODIUM SERPL-SCNC: 127 MMOL/L (ref 135–144)
SPECIMEN DESCRIPTION: NORMAL
WBC OTHER # BLD: 11.3 K/UL (ref 3.5–11.3)

## 2023-10-17 PROCEDURE — 6370000000 HC RX 637 (ALT 250 FOR IP): Performed by: STUDENT IN AN ORGANIZED HEALTH CARE EDUCATION/TRAINING PROGRAM

## 2023-10-17 PROCEDURE — 93005 ELECTROCARDIOGRAM TRACING: CPT | Performed by: NURSE PRACTITIONER

## 2023-10-17 PROCEDURE — 82947 ASSAY GLUCOSE BLOOD QUANT: CPT

## 2023-10-17 PROCEDURE — 97530 THERAPEUTIC ACTIVITIES: CPT

## 2023-10-17 PROCEDURE — 36415 COLL VENOUS BLD VENIPUNCTURE: CPT

## 2023-10-17 PROCEDURE — 73502 X-RAY EXAM HIP UNI 2-3 VIEWS: CPT

## 2023-10-17 PROCEDURE — 99233 SBSQ HOSP IP/OBS HIGH 50: CPT | Performed by: NURSE PRACTITIONER

## 2023-10-17 PROCEDURE — 2060000000 HC ICU INTERMEDIATE R&B

## 2023-10-17 PROCEDURE — 97163 PT EVAL HIGH COMPLEX 45 MIN: CPT

## 2023-10-17 PROCEDURE — 97535 SELF CARE MNGMENT TRAINING: CPT

## 2023-10-17 PROCEDURE — 97110 THERAPEUTIC EXERCISES: CPT

## 2023-10-17 PROCEDURE — 99232 SBSQ HOSP IP/OBS MODERATE 35: CPT | Performed by: STUDENT IN AN ORGANIZED HEALTH CARE EDUCATION/TRAINING PROGRAM

## 2023-10-17 PROCEDURE — 97167 OT EVAL HIGH COMPLEX 60 MIN: CPT

## 2023-10-17 PROCEDURE — 85025 COMPLETE CBC W/AUTO DIFF WBC: CPT

## 2023-10-17 PROCEDURE — 73552 X-RAY EXAM OF FEMUR 2/>: CPT

## 2023-10-17 PROCEDURE — 80048 BASIC METABOLIC PNL TOTAL CA: CPT

## 2023-10-17 PROCEDURE — 84132 ASSAY OF SERUM POTASSIUM: CPT

## 2023-10-17 PROCEDURE — 51798 US URINE CAPACITY MEASURE: CPT

## 2023-10-17 PROCEDURE — 93010 ELECTROCARDIOGRAM REPORT: CPT | Performed by: INTERNAL MEDICINE

## 2023-10-17 RX ORDER — INSULIN GLARGINE 100 [IU]/ML
20 INJECTION, SOLUTION SUBCUTANEOUS NIGHTLY
Status: DISCONTINUED | OUTPATIENT
Start: 2023-10-17 | End: 2023-10-20 | Stop reason: HOSPADM

## 2023-10-17 RX ADMIN — FERROUS SULFATE TAB EC 325 MG (65 MG FE EQUIVALENT) 325 MG: 325 (65 FE) TABLET DELAYED RESPONSE at 08:12

## 2023-10-17 RX ADMIN — INSULIN LISPRO 4 UNITS: 100 INJECTION, SOLUTION INTRAVENOUS; SUBCUTANEOUS at 12:17

## 2023-10-17 RX ADMIN — INSULIN GLARGINE 20 UNITS: 100 INJECTION, SOLUTION SUBCUTANEOUS at 21:33

## 2023-10-17 RX ADMIN — GABAPENTIN 300 MG: 300 CAPSULE ORAL at 08:12

## 2023-10-17 RX ADMIN — TRAMADOL HYDROCHLORIDE 50 MG: 50 TABLET, COATED ORAL at 08:12

## 2023-10-17 RX ADMIN — GABAPENTIN 300 MG: 300 CAPSULE ORAL at 14:56

## 2023-10-17 RX ADMIN — SODIUM ZIRCONIUM CYCLOSILICATE 5 G: 5 POWDER, FOR SUSPENSION ORAL at 12:17

## 2023-10-17 RX ADMIN — INSULIN LISPRO 4 UNITS: 100 INJECTION, SOLUTION INTRAVENOUS; SUBCUTANEOUS at 08:16

## 2023-10-17 RX ADMIN — ROPINIROLE HYDROCHLORIDE 1 MG: 1 TABLET, FILM COATED ORAL at 19:56

## 2023-10-17 RX ADMIN — GABAPENTIN 300 MG: 300 CAPSULE ORAL at 19:56

## 2023-10-17 RX ADMIN — ROPINIROLE HYDROCHLORIDE 1 MG: 1 TABLET, FILM COATED ORAL at 14:56

## 2023-10-17 RX ADMIN — ROPINIROLE HYDROCHLORIDE 1 MG: 1 TABLET, FILM COATED ORAL at 08:12

## 2023-10-17 RX ADMIN — ATORVASTATIN CALCIUM 10 MG: 10 TABLET, FILM COATED ORAL at 08:12

## 2023-10-17 RX ADMIN — TRAMADOL HYDROCHLORIDE 50 MG: 50 TABLET, COATED ORAL at 03:27

## 2023-10-17 RX ADMIN — INSULIN LISPRO 4 UNITS: 100 INJECTION, SOLUTION INTRAVENOUS; SUBCUTANEOUS at 16:16

## 2023-10-17 RX ADMIN — FOLIC ACID 1000 MCG: 1 TABLET ORAL at 08:12

## 2023-10-17 RX ADMIN — METOLAZONE 2.5 MG: 2.5 TABLET ORAL at 08:12

## 2023-10-17 RX ADMIN — DOCUSATE SODIUM 100 MG: 100 CAPSULE, LIQUID FILLED ORAL at 08:12

## 2023-10-17 ASSESSMENT — PAIN DESCRIPTION - DESCRIPTORS
DESCRIPTORS: BURNING
DESCRIPTORS: BURNING

## 2023-10-17 ASSESSMENT — PAIN SCALES - WONG BAKER
WONGBAKER_NUMERICALRESPONSE: 2
WONGBAKER_NUMERICALRESPONSE: 6
WONGBAKER_NUMERICALRESPONSE: 2

## 2023-10-17 ASSESSMENT — PAIN SCALES - GENERAL
PAINLEVEL_OUTOF10: 4
PAINLEVEL_OUTOF10: 8
PAINLEVEL_OUTOF10: 4

## 2023-10-17 ASSESSMENT — PAIN DESCRIPTION - ORIENTATION
ORIENTATION: LEFT
ORIENTATION: LEFT

## 2023-10-17 ASSESSMENT — PAIN - FUNCTIONAL ASSESSMENT: PAIN_FUNCTIONAL_ASSESSMENT: PREVENTS OR INTERFERES SOME ACTIVE ACTIVITIES AND ADLS

## 2023-10-17 ASSESSMENT — PAIN DESCRIPTION - LOCATION
LOCATION: LEG
LOCATION: LEG

## 2023-10-17 NOTE — PROGRESS NOTES
Occupational Therapy  Facility/Department: [facility]  Occupational Therapy Initial Assessment    Name: Meghan Mancilla  : 1934  MRN: [id number]  Date of Service: 10/17/2023    Discharge Recommendations:  Patient would benefit from continued therapy after discharge       Patient Diagnosis(es): The primary encounter diagnosis was Elevated brain natriuretic peptide (BNP) level. Diagnoses of Atypical atrial flutter ([address]), Atrial flutter, unspecified type ([address]), and Sherron-prosthetic femoral shaft fracture were also pertinent to this visit. Past Medical History:  has a past medical history of Atrial flutter ([address]), Diabetes mellitus ([address]), Hypercholesterolemia, Hypertension, and Sleep apnea. Past Surgical History:  has a past surgical history that includes Carpal tunnel release (Bilateral); Cholecystectomy; Cataract extraction (Bilateral); Hysterectomy; Abscess Drainage; Total hip arthroplasty (Bilateral); and hip surgery (Left, 10/16/2023). Assessment   Performance deficits / Impairments: Decreased functional mobility ; Decreased endurance;Decreased ADL status; Decreased balance;Decreased high-level IADLs  Prognosis: Good  Decision Making: Medium Complexity  REQUIRES OT FOLLOW-UP: Yes  Activity Tolerance  Activity Tolerance: Patient limited by pain; Patient Tolerated treatment well  Activity Tolerance Comments: Anxious        Plan   Occupational Therapy Plan  Times Per Week: 4-5x     Restrictions  Restrictions/Precautions  Restrictions/Precautions: Weight Bearing, Fall Risk, Up as Tolerated, Surgical Protocols (pt has L hip drain)  Required Braces or Orthoses?: No  Lower Extremity Weight Bearing Restrictions  Right Lower Extremity Weight Bearing: Weight Bearing As Tolerated  Left Lower Extremity Weight Bearing: Weight Bearing As Tolerated  Position Activity Restriction  Other position/activity restrictions: pt has spacer L hip--per ortho, WBAT    Subjective   General  Patient assessed for rehabilitation 0932         Time Out 1017         Minutes 45         Timed Code Treatment Minutes: 23 Minutes       Octavio Cisse, OTR/L

## 2023-10-17 NOTE — PLAN OF CARE
Problem: Discharge Planning  Goal: Discharge to home or other facility with appropriate resources  10/17/2023 1038 by Etta Chisholm RN  Outcome: Progressing  10/17/2023 0056 by Rob White RN  Outcome: Progressing  Flowsheets (Taken 10/17/2023 0030)  Discharge to home or other facility with appropriate resources: Identify barriers to discharge with patient and caregiver     Problem: Skin/Tissue Integrity  Goal: Absence of new skin breakdown  Description: 1. Monitor for areas of redness and/or skin breakdown  2. Assess vascular access sites hourly  3. Every 4-6 hours minimum:  Change oxygen saturation probe site  4. Every 4-6 hours:  If on nasal continuous positive airway pressure, respiratory therapy assess nares and determine need for appliance change or resting period.   10/17/2023 1038 by Etta Chisholm RN  Outcome: Progressing  10/17/2023 0056 by Rob White RN  Outcome: Progressing     Problem: Safety - Adult  Goal: Free from fall injury  10/17/2023 1038 by Etta Chisholm RN  Outcome: Progressing  10/17/2023 0056 by Rob White RN  Outcome: Progressing  Flowsheets (Taken 10/17/2023 0045)  Free From Fall Injury: Instruct family/caregiver on patient safety     Problem: ABCDS Injury Assessment  Goal: Absence of physical injury  10/17/2023 1038 by Etta Chisholm RN  Outcome: Progressing  10/17/2023 0056 by Rob White RN  Outcome: Progressing  Flowsheets (Taken 10/17/2023 0045)  Absence of Physical Injury: Implement safety measures based on patient assessment     Problem: Pain  Goal: Verbalizes/displays adequate comfort level or baseline comfort level  10/17/2023 1038 by Etta Chisholm RN  Outcome: Progressing  Flowsheets (Taken 10/17/2023 0323 by Rob White RN)  Verbalizes/displays adequate comfort level or baseline comfort level: Encourage patient to monitor pain and request assistance  10/17/2023 0056 by Rob White RN  Outcome: Progressing  Flowsheets (Taken

## 2023-10-17 NOTE — PROGRESS NOTES
Physical Therapy  Facility/Department: [facility]  Physical Therapy Initial Assessment    Name: Capo Cisneros  : 1934  MRN: [id number]  Date of Service: 10/17/2023    History copied and pasted from H+P  80-year-old female with past medical history of diabetes mellitus, hypertension, hypercholesterolemia, atrial flutter on Eliquis, sleep apnea presented to outside facility with complaints of generalized weakness. Patient lives in assisted living, where she fell 2 times in the last 3 days pivoted on walker with the aid lead to assisted let down. Also slipped out of bed today. Patient mentioned that she has been unable to bear weight on her left leg for the last 2 days and complain of left hip pain, imaging was done which showed nondisplaced intertrochanteric fracture. Discussed with Ortho on-call recommended patient will need total hip arthroplasty again and cannot be managed at [facility], CT also showed soft tissue concerns for cellulitis. Started on IV antibiotics and cultures were obtained. Imaging results closed intertrochanteric fracture of left femur and cellulitis of the left hip loosening of femoral component of prosthetic left hip. Pt underwent Left hip irrigation and debridement down to bone  Left total hip hardware explantation with insertion of antibiotic spacer  Insertion of antibiotic stimulan beads left hip  Open treatment humberto-prosthetic left femur fracture 10/16/23. Discharge Recommendations:  Further therapy recommended at discharge. PT Equipment Recommendations  Equipment Needed: No      Patient Diagnosis(es): The primary encounter diagnosis was Elevated brain natriuretic peptide (BNP) level. Diagnoses of Atypical atrial flutter ([address]), Atrial flutter, unspecified type ([address]), and Humberto-prosthetic femoral shaft fracture were also pertinent to this visit.   Past Medical History:  has a past medical history of Atrial flutter ([address]), Diabetes mellitus ([address]), Hypercholesterolemia, Hypertension, and Sleep apnea. Past Surgical History:  has a past surgical history that includes Carpal tunnel release (Bilateral); Cholecystectomy; Cataract extraction (Bilateral); Hysterectomy; Abscess Drainage; Total hip arthroplasty (Bilateral); and hip surgery (Left, 10/16/2023). Assessment   Pt reluctantly cooperative with evaluation--very fearful of having increased pain with movement; denies pain when lying still in bed. PT spoke with pt at [address], pt very anxious re: increased pain; RN gave pain meds and PT returned at 9:30 for eval.  Max A+2 for bed mobility; pt able to dangle EOB w/o complaints for at least 20 minutes (OT working with pt on use of sock aide during part of that time) with SBA+1; stand pivot transfers using rwalker and max A+2--able to bear wt but poor ability to move feet, brian LLE. Pt would benefit from continued therapies prior to returning to previous level of care to improve mobility, safety, functional independence. Body Structures, Functions, Activity Limitations Requiring Skilled Therapeutic Intervention: Decreased functional mobility ; Decreased ROM; Decreased body mechanics; Decreased strength;Decreased safe awareness;Decreased cognition;Decreased balance; Increased pain;Decreased posture  Therapy Prognosis: Fair  Decision Making: High Complexity  Barriers to Learning: high anxiety  Requires PT Follow-Up: Yes  Activity Tolerance  Activity Tolerance: Patient limited by pain; Other (comment)  Activity Tolerance Comments: pt very anxious and needs to be calmed down so that she can continue with mobility--pt able to self calm with verbal cues     Plan   Physcial Therapy Plan  General Plan:  (5-6 visits weekly)  Current Treatment Recommendations: Strengthening, Balance training, Functional mobility training, Transfer training, Endurance training, Wheelchair mobility training, Gait training, Pain management, Home exercise program, Safety education & training,

## 2023-10-17 NOTE — DISCHARGE INSTRUCTIONS
Orthopedic Instructions:  -Weight bearing status: Weight bearing as tolerated for the left leg with walker  -Keep dressing clean and dry.  -Do not remove dressings  -Always look for signs of compartment syndrome: pain out of proportion to the injury, pain not controlled with pain medication, numbness in digits, changing of color of digits (paleness). If these signs occur return to ED immediately for reassessment.   -Always work on motion (to non-injured toes) to decrease swelling.  -Ok to shower but no soaks in a bath, hot tub, pool, etc  -Ice (20 minutes on and off 1 hour) and elevate above the level of the heart to reduce swelling and throbbing pain. -Drink plenty of fluids.  -Call the office or come to Emergency Room if signs of infection appear (hot, swollen, red, draining pus, fever)  -Take medications as prescribed.  -Wean off narcotics (percocet/norco) as soon as possible. Do not take tylenol if still taking narcotics.  -Follow up with  Ortho trauma clinic  in their office on 11/1/23 at 2 pm after surgery. Call [phone] to schedule/confirm.

## 2023-10-17 NOTE — PROGRESS NOTES
Orthopedic Progress Note    Patient:  Kae Bautista  YOB: 1934     80 y.o. female    Subjective:  Patient seen and examined at bedside this morning  Patient bradycardic overnight (HR 40s)  Alert and oriented upon ortho evaluation  Pain controlled, resting comfortably   Denies fever, HA, CP, SOB, N/V, dysuria    Vitals reviewed, afebrile    Objective:   Vitals:    10/17/23 0357   BP:    Pulse:    Resp: 15   Temp:    SpO2:      Gen: NAD, cooperative   Cardiovascular: Regular rate  Respiratory: Chest symmetric, no accessory muscle use        Orthopedic Exam:  LLE: Optifoams on, which are C/D/I. Hemovac drain sewn in which, currently has 45 cc of bloody output in cannister (90cc throughout shift). TTP about the hip. Compartments soft and easily compressible. EHL/FHL/TA/GS complex motor intact. Sural/saphenous/SPN/DPN/plantar nerve distribution SILT. DP and PT pulses 2+ with BCR. Recent Labs     10/14/23  0725 10/15/23  0800 10/16/23  0430   WBC 12.5*   < > 8.6   HGB 9.4*   < > 9.3*   HCT 30.1*   < > 30.1*      < > 355   INR 1.8  --   --    *   < > 130*   K 3.7   < > 4.5   BUN 33*   < > 27*   CREATININE 1.0*   < > 0.8   GLUCOSE 197*   < > 197*    < > = values in this interval not displayed. Meds:  Chemical AC: Heparin  ABX: Vancomycin  See rec for complete list    Impression/plan: 80 y.o. female who sustained a left periprosthetic proximal femur fracture with septic loosening of femoral component being seen for:     - Left hip I&D/explant and stage 1 hip antibiotic spacer application/ORIF femur, POD1    -No plans for further orthopedic intervention during hospital admission  -WB status: WBAT LLE  -Pain control per primary  -Intraop cultures showing NGTD currently, will continue ton trend  -Will follow up intraop pathology cultures  -Drain in with 90cc of bloody output overnight, will continue to maintain.    -DVT ppx: heparin, ok to continue chemical AC today (POD1)  -Ice

## 2023-10-17 NOTE — PLAN OF CARE
Problem: Discharge Planning  Goal: Discharge to home or other facility with appropriate resources  10/17/2023 0056 by Zuri Mcknight RN  Outcome: Progressing  Flowsheets (Taken 10/17/2023 0030)  Discharge to home or other facility with appropriate resources: Identify barriers to discharge with patient and caregiver  10/16/2023 1552 by Areli Pettit RN  Outcome: Progressing     Problem: Skin/Tissue Integrity  Goal: Absence of new skin breakdown  Description: 1. Monitor for areas of redness and/or skin breakdown  2. Assess vascular access sites hourly  3. Every 4-6 hours minimum:  Change oxygen saturation probe site  4. Every 4-6 hours:  If on nasal continuous positive airway pressure, respiratory therapy assess nares and determine need for appliance change or resting period.   10/17/2023 0056 by Zuri Mcknight RN  Outcome: Progressing  10/16/2023 1552 by Areli Pettit RN  Outcome: Progressing     Problem: Safety - Adult  Goal: Free from fall injury  10/17/2023 0056 by Zuri Mcknight RN  Outcome: Progressing  10/16/2023 1552 by Areli Pettit RN  Outcome: Progressing     Problem: ABCDS Injury Assessment  Goal: Absence of physical injury  10/17/2023 0056 by Zuri Mcknight RN  Outcome: Progressing  10/16/2023 1552 by Areli Pettit RN  Outcome: Progressing     Problem: Pain  Goal: Verbalizes/displays adequate comfort level or baseline comfort level  10/17/2023 0056 by Zuri Mcknight RN  Outcome: Progressing  10/16/2023 1552 by Areli Pettit RN  Outcome: Progressing     Problem: Chronic Conditions and Co-morbidities  Goal: Patient's chronic conditions and co-morbidity symptoms are monitored and maintained or improved  10/17/2023 0056 by Zuri Mcknight RN  Outcome: Progressing  Flowsheets (Taken 10/17/2023 0030)  Care Plan - Patient's Chronic Conditions and Co-Morbidity Symptoms are Monitored and Maintained or Improved: Monitor and assess patient's chronic conditions and comorbid

## 2023-10-17 NOTE — CARE COORDINATION
Transitional Planning:  Spoke with patient, son & daughter at bedside re: rehab [phone]. They would like [facility] on [facility]. It is 24/7 facility. If not accepted they would like [facility]. Patient currently is in their assisted living. Call to [facility]. Left message with admissions for cb. Asked for fax number for [facility]. Call back from [address] [place] [address] [phone] at [facility]. Fax is [phone]. Clincials faxed. They will review clinicals first thing in am and anticipate they can accept. CM needs to verfiy in am that they have started precert.

## 2023-10-17 NOTE — BRIEF OP NOTE
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  Left periprosthetic proximal femur fracture       Procedure(s):  Left hip irrigation and debridement down to bone 98460  Left total hip hardware explantation with insertion of antibiotic spacer 60336  Insertion of antibiotic stimulan beads left hip 20700  Open treatment humberto-prosthetic left femur fracture 05477    Surgeon(s):  Asim Fields DO    Assistant:  Resident: Jocelin Lainez; Han Theodore DO    Anesthesia: General    Estimated Blood Loss (mL): 200     Fluids: 2200mL crystalloids    Complications: None    Specimens:   ID Type Source Tests Collected by Time Destination   1 : LEFT HIP Swab Hip CULTURE, WOUND (Canceled), CULTURE, ANAEROBIC AND AEROBIC [facility] 10/16/2023 1745    A : LEFT HIP Tissue Hip SURGICAL PATHOLOGY, CULTURE, FUNGUS, CULTURE, TISSUE, CULTURE, ANAEROBIC AND AEROBIC (Canceled) Janajesus MIDDLETON,  10/16/2023 1812        Implants:  Osteoremidies 54 mm cup, 46 mm head, medium long stem  2 mm cable x 2  Implant Name Type Inv.  Item Serial No.  Lot No. LRB No. Used Action   SET CBL SL SM DIA2MM VIT BEAD DALL-M - [id number]  SET CBL SL SM DIA2MM VIT BEAD DALL-M  [employer] [facility] [id number] Left 1 Implanted   SET CBL SL SM DIA2MM VIT BEAD DALL-M - [id number]  SET CBL SL SM DIA2MM VIT BEAD DALL-M  [employer] [facility] [id number] Left 1 Implanted   STEM FEM M LNG HIP REMEDY - [id number]  STEM FEM M LNG HIP REMEDY  OSTEOREMEDIES- [id number] Left 1 Implanted   HEAD ACET [id number] 0.9GM GENTMYCN BASE MOD REMEDY - [id number]  HEAD ACET [id number] 0.9GM GENTMYCN BASE MOD REMEDY  OSTEOREMEDIES- [id number] Left 1 Implanted   CUP ACET OD54MM [id number] GENTMYCN

## 2023-10-17 NOTE — PROGRESS NOTES
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  Osteoporosis vs subacute infection  Periprosthetic fracture Lf proximal femur  S/P Left hip irrigation and debridement down to bone on 10-16-23  Left total hip hardware explantation with insertion of antibiotic spacer  Insertion of antibiotic stimulan beads left hip  Open treatment humberto-prosthetic left femur fracture  Advanced age  DM type 2  HTN  Hypercholesterolemia  Atrial flutter, on anticoagulation  DELL  Elevated CRP    Recommendations:   Blood cultures 10/14/23 negative thus far  Vancomycin given at time of surgery on 10-16-23  MRSA screen     Medical Decision Making/Summary/Discussion:10/17/2023       Infection Control Recommendations   [place] Precautions    Antimicrobial Stewardship Recommendations     Simplification of therapy  Targeted therapy  PK dosing    Coordination of Outpatient Care:   Estimated Length of IV antimicrobials:TBD  Patient will need Midline Catheter Insertion: TBD  Patient will need PICC line Insertion:TBD  Patient will need: Home IV , [address] [facility],  SNF,  LTAC: TBD  Patient will need outpatient wound care: Yes    Chief complaint/reason for consultation:   Concern with prosthetic Lt hip infection      History of Present Illness:   Noella Kehr is a 80y.o.-year-old  female who was initially admitted on 10/13/2023. Patient seen at the request of Marnie Mack. INITIAL HISTORY:    Elderly patient who resides in an assisted living facility. She apparently fell twice in the last 2 days and has been unable to bear weight on her left leg since her last fall. She was taken to [facility] where X rays showed a non displaced intertrochanteric fracture. In addition she was found to have erythema of the left hip area, leukocytosis. She was started on Zosyn.  Patient was transferred to 10:20 AM          ATTESTATION:    I have discussed the case, including pertinent history and exam findings with the medical resident. I have evaluated the  History, physical findings and pictures of the patient and the key elements of the encounter have been performed by me. I have reviewed the laboratory data, other diagnostic studies and discussed them with the medical resident. I have updated the medical record where necessary. I agree with the assessment, plan and orders as documented by the medical resident and I have modified them as necessary. Elements of Medical Decision Making:  Note: I have independently performed the steps listed below as part of the medical decision making and evaluation. Examined and discussed with patient. Labs, medications, radiologic studies were reviewed with personal review of films  Large amounts of data were reviewed  Discussed with nursing Staff, Discharge planner  Infection Control and Prevention measures reviewed  All prior entries were reviewed  Administer medications as ordered  Prognosis: Guarded  Discharge planning reviewed  Follow up as outpatient.     Julieta Dodd MD.

## 2023-10-17 NOTE — ANESTHESIA POSTPROCEDURE EVALUATION
Department of Anesthesiology  Postprocedure Note    Patient: Mason Acharya  MRN: [id number]  YOB: 1934  Date of evaluation: 10/17/2023      Procedure Summary     Date: 10/16/23 Room / Location: [address]    Anesthesia Start: 1649 Anesthesia Stop: 2251    Procedure: LEFT HIP PROSTHETIC EXPLANT WITH IRRIGATION AND DEBRIDEMENT AND IMPLANTATION OF ANTIBIOTIC HIP SPACER (Left) Diagnosis:       Sherron-prosthetic femoral shaft fracture      (Sherron-prosthetic femoral shaft fracture [id number]. Dewain Filler)    Surgeons: Rocael Aguayo DO Responsible Provider: Rosa Navarro MD    Anesthesia Type: general ASA Status: 4          Anesthesia Type: No value filed.     Randy Phase I: Randy Score: 9    Randy Phase II:        Anesthesia Post Evaluation    Patient location during evaluation: PACU  Patient participation: complete - patient participated  Level of consciousness: awake and alert  Airway patency: patent  Nausea & Vomiting: no nausea and no vomiting  Complications: no  Cardiovascular status: blood pressure returned to baseline  Respiratory status: acceptable  Hydration status: stable  Pain management: adequate

## 2023-10-18 LAB
ANION GAP SERPL CALCULATED.3IONS-SCNC: 13 MMOL/L (ref 9–17)
BASOPHILS # BLD: <0.03 K/UL (ref 0–0.2)
BASOPHILS NFR BLD: 0 % (ref 0–2)
BUN SERPL-MCNC: 37 MG/DL (ref 8–23)
CALCIUM SERPL-MCNC: 9 MG/DL (ref 8.6–10.4)
CHLORIDE SERPL-SCNC: 83 MMOL/L (ref 98–107)
CO2 SERPL-SCNC: 26 MMOL/L (ref 20–31)
CREAT SERPL-MCNC: 1 MG/DL (ref 0.5–0.9)
EOSINOPHIL # BLD: <0.03 K/UL (ref 0–0.44)
EOSINOPHILS RELATIVE PERCENT: 0 % (ref 1–4)
ERYTHROCYTE [DISTWIDTH] IN BLOOD BY AUTOMATED COUNT: 15.4 % (ref 11.8–14.4)
GFR SERPL CREATININE-BSD FRML MDRD: 54 ML/MIN/1.73M2
GLUCOSE BLD-MCNC: 182 MG/DL (ref 65–105)
GLUCOSE BLD-MCNC: 226 MG/DL (ref 65–105)
GLUCOSE BLD-MCNC: 316 MG/DL (ref 65–105)
GLUCOSE BLD-MCNC: 399 MG/DL (ref 65–105)
GLUCOSE SERPL-MCNC: 318 MG/DL (ref 70–99)
HCT VFR BLD AUTO: 24.8 % (ref 36.3–47.1)
HGB BLD-MCNC: 7.6 G/DL (ref 11.9–15.1)
IMM GRANULOCYTES # BLD AUTO: 0.24 K/UL (ref 0–0.3)
IMM GRANULOCYTES NFR BLD: 2 %
LYMPHOCYTES NFR BLD: 0.89 K/UL (ref 1.1–3.7)
LYMPHOCYTES RELATIVE PERCENT: 8 % (ref 24–43)
MCH RBC QN AUTO: 28.8 PG (ref 25.2–33.5)
MCHC RBC AUTO-ENTMCNC: 30.6 G/DL (ref 28.4–34.8)
MCV RBC AUTO: 93.9 FL (ref 82.6–102.9)
MONOCYTES NFR BLD: 0.97 K/UL (ref 0.1–1.2)
MONOCYTES NFR BLD: 8 % (ref 3–12)
NEUTROPHILS NFR BLD: 82 % (ref 36–65)
NEUTS SEG NFR BLD: 9.71 K/UL (ref 1.5–8.1)
NRBC BLD-RTO: 0 PER 100 WBC
PLATELET # BLD AUTO: 341 K/UL (ref 138–453)
PMV BLD AUTO: 9.2 FL (ref 8.1–13.5)
POTASSIUM SERPL-SCNC: 4.4 MMOL/L (ref 3.7–5.3)
RBC # BLD AUTO: 2.64 M/UL (ref 3.95–5.11)
RBC # BLD: ABNORMAL 10*6/UL
SODIUM SERPL-SCNC: 122 MMOL/L (ref 135–144)
WBC OTHER # BLD: 11.9 K/UL (ref 3.5–11.3)

## 2023-10-18 PROCEDURE — 99232 SBSQ HOSP IP/OBS MODERATE 35: CPT | Performed by: NURSE PRACTITIONER

## 2023-10-18 PROCEDURE — 6370000000 HC RX 637 (ALT 250 FOR IP): Performed by: STUDENT IN AN ORGANIZED HEALTH CARE EDUCATION/TRAINING PROGRAM

## 2023-10-18 PROCEDURE — 97530 THERAPEUTIC ACTIVITIES: CPT

## 2023-10-18 PROCEDURE — 6370000000 HC RX 637 (ALT 250 FOR IP): Performed by: NURSE PRACTITIONER

## 2023-10-18 PROCEDURE — 87641 MR-STAPH DNA AMP PROBE: CPT

## 2023-10-18 PROCEDURE — 2060000000 HC ICU INTERMEDIATE R&B

## 2023-10-18 PROCEDURE — 85025 COMPLETE CBC W/AUTO DIFF WBC: CPT

## 2023-10-18 PROCEDURE — 80048 BASIC METABOLIC PNL TOTAL CA: CPT

## 2023-10-18 PROCEDURE — 2580000003 HC RX 258: Performed by: INTERNAL MEDICINE

## 2023-10-18 PROCEDURE — 97110 THERAPEUTIC EXERCISES: CPT

## 2023-10-18 PROCEDURE — 36415 COLL VENOUS BLD VENIPUNCTURE: CPT

## 2023-10-18 PROCEDURE — 6360000002 HC RX W HCPCS: Performed by: INTERNAL MEDICINE

## 2023-10-18 PROCEDURE — 82947 ASSAY GLUCOSE BLOOD QUANT: CPT

## 2023-10-18 RX ORDER — METFORMIN HYDROCHLORIDE 500 MG/1
500 TABLET, EXTENDED RELEASE ORAL
Status: DISCONTINUED | OUTPATIENT
Start: 2023-10-19 | End: 2023-10-20 | Stop reason: HOSPADM

## 2023-10-18 RX ORDER — INSULIN LISPRO 100 [IU]/ML
4 INJECTION, SOLUTION INTRAVENOUS; SUBCUTANEOUS ONCE
Status: COMPLETED | OUTPATIENT
Start: 2023-10-18 | End: 2023-10-18

## 2023-10-18 RX ORDER — CARVEDILOL 6.25 MG/1
6.25 TABLET ORAL 2 TIMES DAILY WITH MEALS
Status: DISCONTINUED | OUTPATIENT
Start: 2023-10-18 | End: 2023-10-19

## 2023-10-18 RX ADMIN — FERROUS SULFATE TAB EC 325 MG (65 MG FE EQUIVALENT) 325 MG: 325 (65 FE) TABLET DELAYED RESPONSE at 07:59

## 2023-10-18 RX ADMIN — TRAMADOL HYDROCHLORIDE 50 MG: 50 TABLET, COATED ORAL at 07:58

## 2023-10-18 RX ADMIN — GABAPENTIN 300 MG: 300 CAPSULE ORAL at 07:58

## 2023-10-18 RX ADMIN — CARVEDILOL 6.25 MG: 6.25 TABLET, FILM COATED ORAL at 16:46

## 2023-10-18 RX ADMIN — INSULIN GLARGINE 20 UNITS: 100 INJECTION, SOLUTION SUBCUTANEOUS at 20:54

## 2023-10-18 RX ADMIN — ROPINIROLE HYDROCHLORIDE 1 MG: 1 TABLET, FILM COATED ORAL at 07:58

## 2023-10-18 RX ADMIN — GABAPENTIN 300 MG: 300 CAPSULE ORAL at 20:54

## 2023-10-18 RX ADMIN — INSULIN LISPRO 6 UNITS: 100 INJECTION, SOLUTION INTRAVENOUS; SUBCUTANEOUS at 12:15

## 2023-10-18 RX ADMIN — INSULIN LISPRO 4 UNITS: 100 INJECTION, SOLUTION INTRAVENOUS; SUBCUTANEOUS at 20:55

## 2023-10-18 RX ADMIN — METFORMIN HYDROCHLORIDE 1000 MG: 500 TABLET ORAL at 20:54

## 2023-10-18 RX ADMIN — TRAMADOL HYDROCHLORIDE 50 MG: 50 TABLET, COATED ORAL at 14:17

## 2023-10-18 RX ADMIN — VANCOMYCIN HYDROCHLORIDE 1000 MG: 1 INJECTION, POWDER, LYOPHILIZED, FOR SOLUTION INTRAVENOUS at 12:28

## 2023-10-18 RX ADMIN — INSULIN LISPRO 2 UNITS: 100 INJECTION, SOLUTION INTRAVENOUS; SUBCUTANEOUS at 16:46

## 2023-10-18 RX ADMIN — METOLAZONE 2.5 MG: 2.5 TABLET ORAL at 07:58

## 2023-10-18 RX ADMIN — FOLIC ACID 1000 MCG: 1 TABLET ORAL at 07:58

## 2023-10-18 RX ADMIN — APIXABAN 5 MG: 5 TABLET, FILM COATED ORAL at 20:54

## 2023-10-18 RX ADMIN — ROPINIROLE HYDROCHLORIDE 1 MG: 1 TABLET, FILM COATED ORAL at 20:54

## 2023-10-18 RX ADMIN — DOCUSATE SODIUM 100 MG: 100 CAPSULE, LIQUID FILLED ORAL at 07:58

## 2023-10-18 RX ADMIN — GABAPENTIN 300 MG: 300 CAPSULE ORAL at 14:17

## 2023-10-18 RX ADMIN — ATORVASTATIN CALCIUM 10 MG: 10 TABLET, FILM COATED ORAL at 07:58

## 2023-10-18 RX ADMIN — ROPINIROLE HYDROCHLORIDE 1 MG: 1 TABLET, FILM COATED ORAL at 14:17

## 2023-10-18 NOTE — PROGRESS NOTES
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  Infection noted in OR with purulence down to components  Periprosthetic fracture Lf proximal femur  S/P Left hip irrigation and debridement down to bone on 10-16-23  Left total hip hardware explantation with insertion of antibiotic spacer  Insertion of antibiotic stimulan beads left hip  Open treatment humberto-prosthetic left femur fracture  Advanced age  DM type 2  HTN  Hypercholesterolemia  Atrial flutter, on anticoagulation  DELL  Elevated CRP    Recommendations: On Vancomycin because of infection found in OR  Blood cultures 10/14/23: No growth   Follow up on tissue culture 10/16/23, no growth  MRSA screen : pending    Medical Decision Making/Summary/Discussion:10/18/2023       Infection Control Recommendations   [place] Precautions    Antimicrobial Stewardship Recommendations     Simplification of therapy  Targeted therapy  PK dosing    Coordination of Outpatient Care:   Estimated Length of IV antimicrobials:TBD  Patient will need Midline Catheter Insertion: TBD  Patient will need PICC line Insertion:TBD  Patient will need: Home IV , [address],  [facility],  LTAC: TBD  Patient will need outpatient wound care: Yes    Chief complaint/reason for consultation:   Concern with prosthetic Lt hip infection      History of Present Illness:   Aliya Bueno is a 80y.o.-year-old  female who was initially admitted on 10/13/2023. Patient seen at the request of Krish Burns. INITIAL HISTORY:    Elderly patient who resides in an assisted living facility. She apparently fell twice in the last 2 days and has been unable to bear weight on her left leg since her last fall. She was taken to [facility] where X rays showed a non displaced intertrochanteric fracture.   In addition she was found to have erythema of the left hip [[id number]] Collected: 10/14/23 0725    Order Status: Completed Specimen: Blood Updated: 10/17/23 0809     Specimen Description . BLOOD     Special Requests RIGHT FOREARM 3.5ML     Culture NO GROWTH 3 DAYS              Medical Decision Making-Other:     Note:    Gabriela Alvarez MD  Internal Medicine Resident, PGY-1  [place], [place]  10/18/2023, 8:07 AM          ATTESTATION:    I have discussed the case, including pertinent history and exam findings with the medical resident. I have evaluated the  History, physical findings and pictures of the patient and the key elements of the encounter have been performed by me. I have reviewed the laboratory data, other diagnostic studies and discussed them with the medical resident. I have updated the medical record where necessary. I agree with the assessment, plan and orders as documented by the medical resident and I have modified them as necessary. Elements of Medical Decision Making:  Note: I have independently performed the steps listed below as part of the medical decision making and evaluation. Examined and discussed with patient. Labs, medications, radiologic studies were reviewed with personal review of films  Large amounts of data were reviewed  Discussed with nursing Staff, Discharge planner  Infection Control and Prevention measures reviewed  All prior entries were reviewed  Administer medications as ordered  Prognosis: Guarded  Discharge planning reviewed  Follow up as outpatient.     Rosa Leung MD.

## 2023-10-18 NOTE — PLAN OF CARE
Problem: Discharge Planning  Goal: Discharge to home or other facility with appropriate resources  Outcome: Progressing  Flowsheets (Taken 10/17/2023 1940)  Discharge to home or other facility with appropriate resources: Identify barriers to discharge with patient and caregiver     Problem: Skin/Tissue Integrity  Goal: Absence of new skin breakdown  Description: 1. Monitor for areas of redness and/or skin breakdown  2. Assess vascular access sites hourly  3. Every 4-6 hours minimum:  Change oxygen saturation probe site  4. Every 4-6 hours:  If on nasal continuous positive airway pressure, respiratory therapy assess nares and determine need for appliance change or resting period.   Outcome: Progressing     Problem: Safety - Adult  Goal: Free from fall injury  Outcome: Progressing  Flowsheets (Taken 10/17/2023 2000)  Free From Fall Injury: Instruct family/caregiver on patient safety     Problem: ABCDS Injury Assessment  Goal: Absence of physical injury  Outcome: Progressing  Flowsheets (Taken 10/17/2023 2000)  Absence of Physical Injury: Implement safety measures based on patient assessment     Problem: Pain  Goal: Verbalizes/displays adequate comfort level or baseline comfort level  Outcome: Progressing  Flowsheets (Taken 10/17/2023 1924)  Verbalizes/displays adequate comfort level or baseline comfort level: Encourage patient to monitor pain and request assistance     Problem: Chronic Conditions and Co-morbidities  Goal: Patient's chronic conditions and co-morbidity symptoms are monitored and maintained or improved  Outcome: Progressing  Flowsheets (Taken 10/17/2023 1940)  Care Plan - Patient's Chronic Conditions and Co-Morbidity Symptoms are Monitored and Maintained or Improved: Monitor and assess patient's chronic conditions and comorbid symptoms for stability, deterioration, or improvement

## 2023-10-18 NOTE — PROGRESS NOTES
10/16/23  2257 10/17/23  0737 10/17/23  1138 10/17/23  1507 10/17/23  1924   POCGLU 172* 229* 255* 299* 287* 231*     ABG:No results found for: \"POCPH\", \"PHART\", \"PH\", \"POCPCO2\", \"BJY0XJZ\", \"PCO2\", \"POCPO2\", \"PO2ART\", \"PO2\", \"POCHCO3\", \"WIS7FSY\", \"HCO3\", \"NBEA\", \"PBEA\", \"BEART\", \"BE\", \"THGBART\", \"THB\", \"WAR6XGF\", \"TARB1GTI\", \"X9RUQORQ\", \"O2SAT\", \"FIO2\"  Lab Results   Component Value Date/Time    SPECIAL LEFT HAND 6ML 10/14/2023 07:39 AM     Lab Results   Component Value Date/Time    CULTURE PENDING 10/16/2023 06:30 PM    CULTURE NO GROWTH 13 HOURS 10/16/2023 06:30 PM       Radiology:  XR FEMUR LEFT (MIN 2 VIEWS)    Result Date: 10/17/2023  Postsurgical change. No complication. XR HIP 2-3 VW W PELVIS LEFT    Result Date: 10/17/2023  Postsurgical change. No complication. CT PELVIS WO CONTRAST Additional Contrast? None    Result Date: 10/14/2023  1. Bilateral total hip arthroplasties without displacement. 2.  Sherron-implant fractures left hip including the medial subtrochanteric femur and the greater tuberosity of the left femur dorsal aspect. 3.  Significant soft tissue swelling about the left hip. XR CHEST PORTABLE    Result Date: 10/14/2023  Cardiomegaly with left effusion, small and pulmonary vascular congestion. Superimposed pneumonitis difficult to exclude particularly in the left retrocardiac area. Physical Examination:        General appearance:  alert, cooperative and no distress  Mental Status:  oriented to person, place and time and normal affect  Lungs:  clear to auscultation bilaterally, normal effort  Heart:  regular rate and rhythm, no murmur  Abdomen:  soft, nontender, nondistended, normal bowel sounds, no masses, hepatomegaly, splenomegaly  Extremities:  no edema, redness, tenderness in the calves. Left hip mepilex dressing. Distal to hip dressing, intact hemovac.    Skin:  no gross lesions, rashes, induration    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, initial encounter ([address]) 10/13/2023 Yes    Elevated brain natriuretic peptide (BNP) level 10/14/2023 Yes    Pre-op evaluation 10/14/2023 Yes    Hypertension 10/14/2023 Yes    Diabetes mellitus ([address]) 10/14/2023 Yes    Hypercholesterolemia 10/14/2023 Yes    Atrial flutter ([address]) 10/14/2023 Yes    Hyponatremia 10/14/2023 Yes    Fall 10/14/2023 Yes    Leukocytosis 10/14/2023 Yes    Anemia 10/14/2023 Yes    Hypokalemia 10/14/2023 Yes    Renal insufficiency 10/14/2023 Yes    Closed fracture of neck of left femur ([address]) 10/14/2023 Yes    Loosening of prosthesis of left hip joint ([address]) 10/14/2023 Yes    Advanced age 10/14/2023 Yes       Plan:        Left periprosthetic femur fracture with surgical site infection s/p I/D with exploration of hardware and insertion of antibiotic spacer. Ortho following. Mo further plans for intervention. ID following--pending culture. Received Vanco in OR. Pending cultures for simplified, targeted therapy. Afebrile. HTN: -150. Lisinopril help 2/2 CHRISTOPHE vs CKD. Will add low bb with known history of A-flutter. Continue low dose Zaroxolyn. Anemia: 200 ml from hemovac in last 24 hours. Hgb down to 7.6 today. Venofer x 3 given. Remains on Iron supplementation daily. Will continue to trend. Hyponatremia: Slightly improved today. No neuro deficits, Will continue to trend. DM management: Hyperglycemic. Continue Lantus at hs. Restart metfomin BID today. Continue accu-checks ac/hs with MSSI. Change diet to 4gm/meal carb controlled. Hypoglycemia treatment orders as needed. A-flutter: Currently SR. Restart Eliquis. Start low dose BB with hold parameters. HLD: Statin  PT/OT eval and treat  Dispo: Plans to transfer from assisted living to rehab area of [facility]. CM to start precert.      PUJA Samuels NP  10/18/2023  7:33 AM

## 2023-10-18 NOTE — PROGRESS NOTES
Physical Therapy  Facility/Department: [facility] 4A STEPDOWN  Daily Treatment Note    Name: Cordelia Patel  : 1934  MRN: [id number]  Date of Service: 10/18/2023    Discharge Recommendations:  Patient would benefit from continued therapy after discharge   PT Equipment Recommendations  Equipment Needed: No      Patient Diagnosis(es): The primary encounter diagnosis was Elevated brain natriuretic peptide (BNP) level. Diagnoses of Atypical atrial flutter ([address]), Atrial flutter, unspecified type ([address]), and Sherron-prosthetic femoral shaft fracture were also pertinent to this visit. Past Medical History:  has a past medical history of Atrial flutter ([address]), Diabetes mellitus ([address]), Hypercholesterolemia, Hypertension, and Sleep apnea. Past Surgical History:  has a past surgical history that includes Carpal tunnel release (Bilateral); Cholecystectomy; Cataract extraction (Bilateral); Hysterectomy; Abscess Drainage; Total hip arthroplasty (Bilateral); hip surgery (Left, 10/16/2023); and hip surgery (Left, 10/16/2023). Assessment   Body Structures, Functions, Activity Limitations Requiring Skilled Therapeutic Intervention: Decreased functional mobility ; Decreased ROM; Decreased body mechanics; Decreased strength;Decreased safe awareness;Decreased cognition;Decreased balance; Increased pain;Decreased posture  Assessment: Pt presenting with mobility deficits requiring maxA x 2 to achieve EOB status, Pt also required maxA x 2 for functional transfers. No functional steps taken this date, Pt would benefit from continued physical therapy at [facility] to address pt LEs weakenss, balance, and gait mechanics.   Therapy Prognosis: Fair  Activity Tolerance  Activity Tolerance: Patient limited by pain  Activity Tolerance Comments: pt very anxious and needs to be calmed down so that she can continue with mobility--pt able to self calm with verbal cues     Plan   Physcial Therapy Plan  General Plan:  (5-6 visits weekly)  Current Treatment Recommendations: Strengthening, Balance training, Functional mobility training, Transfer training, Endurance training, Wheelchair mobility training, Gait training, Pain management, Home exercise program, Safety education & training, Patient/Caregiver education & training, Equipment evaluation, education, & procurement, Positioning, Therapeutic activities  Additional Comments: no ex L hip; OK for isometrics, L knee/ankle (knee in sitting)  Safety Devices  Type of Devices: Call light within reach, Gait belt, Patient at risk for falls, Nurse notified, Left in chair, Chair alarm in place  Restraints  Restraints Initially in Place: Yes     Restrictions  Restrictions/Precautions  Restrictions/Precautions: Weight Bearing, Fall Risk, Up as Tolerated, Surgical Protocols (pt has L hip drain)  Required Braces or Orthoses?: No  Lower Extremity Weight Bearing Restrictions  Right Lower Extremity Weight Bearing: Weight Bearing As Tolerated  Left Lower Extremity Weight Bearing: Weight Bearing As Tolerated  Position Activity Restriction  Other position/activity restrictions: pt has spacer L hip--per ortho, WBAT     Subjective   General  Chart Reviewed: No  Patient assessed for rehabilitation services?: Yes  Response To Previous Treatment: Patient with no complaints from previous session. Family / Caregiver Present: No  Follows Commands: Within Functional Limits  General Comment  Comments: Pt retired to recliner with call light and RN notified. Subjective  Subjective: Pt and RN agreeable to tx session, Pt supine in bed upon arrival reporting pain in L hip, RN made aware. Vision/Hearing       Cognition   Orientation  Overall Orientation Status: Within Functional Limits  Orientation Level: Oriented X4  Cognition  Overall Cognitive Status: Exceptions  Arousal/Alertness: Delayed responses to stimuli  Following Commands: Follows one step commands with increased time; Follows one step commands with repetition  Attention Span: Attends

## 2023-10-18 NOTE — CARE COORDINATION
Call from Chase Thompson at [facility], pt is in assisted living at facility, if she will need to go to SNF side she will need precert    PS to Sneha Monique to verify when discharge is anticipated, no further surgical plan documented per Ortho and Cardiology is signing off.     1679 Per Jada Manriquez precert can be started, Chase Thompson from [facility] notified and she will begin precert, spoke with pts RN and she relates she discussed antx needs with Dr Nino Tamayo and the plan is for pt to be discharged on antx.  Chase Thompson updated and she will begin precert, pt will need line if IV antx    8176 Call from Chase Thompson at [facility] they have obtained precert and can accept when ready, if discharging tomorrow will need to notify Landon Narvaez at [phone]

## 2023-10-19 ENCOUNTER — APPOINTMENT (OUTPATIENT)
Dept: GENERAL RADIOLOGY | Age: 88
End: 2023-10-19
Attending: STUDENT IN AN ORGANIZED HEALTH CARE EDUCATION/TRAINING PROGRAM
Payer: COMMERCIAL

## 2023-10-19 LAB
ANION GAP SERPL CALCULATED.3IONS-SCNC: 8 MMOL/L (ref 9–17)
BASOPHILS # BLD: 0.03 K/UL (ref 0–0.2)
BASOPHILS NFR BLD: 0 % (ref 0–2)
BUN SERPL-MCNC: 32 MG/DL (ref 8–23)
CALCIUM SERPL-MCNC: 8.9 MG/DL (ref 8.6–10.4)
CHLORIDE SERPL-SCNC: 87 MMOL/L (ref 98–107)
CO2 SERPL-SCNC: 31 MMOL/L (ref 20–31)
CREAT SERPL-MCNC: 0.9 MG/DL (ref 0.5–0.9)
CRP SERPL HS-MCNC: 27.2 MG/L (ref 0–5)
EOSINOPHIL # BLD: 0.19 K/UL (ref 0–0.44)
EOSINOPHILS RELATIVE PERCENT: 2 % (ref 1–4)
ERYTHROCYTE [DISTWIDTH] IN BLOOD BY AUTOMATED COUNT: 15.7 % (ref 11.8–14.4)
GFR SERPL CREATININE-BSD FRML MDRD: >60 ML/MIN/1.73M2
GLUCOSE BLD-MCNC: 139 MG/DL (ref 65–105)
GLUCOSE BLD-MCNC: 217 MG/DL (ref 65–105)
GLUCOSE BLD-MCNC: 293 MG/DL (ref 65–105)
GLUCOSE BLD-MCNC: 311 MG/DL (ref 65–105)
GLUCOSE SERPL-MCNC: 138 MG/DL (ref 70–99)
HCT VFR BLD AUTO: 26.8 % (ref 36.3–47.1)
HGB BLD-MCNC: 8.3 G/DL (ref 11.9–15.1)
IMM GRANULOCYTES # BLD AUTO: 0.42 K/UL (ref 0–0.3)
IMM GRANULOCYTES NFR BLD: 4 %
LYMPHOCYTES NFR BLD: 1.21 K/UL (ref 1.1–3.7)
LYMPHOCYTES RELATIVE PERCENT: 11 % (ref 24–43)
MCH RBC QN AUTO: 29.1 PG (ref 25.2–33.5)
MCHC RBC AUTO-ENTMCNC: 31 G/DL (ref 28.4–34.8)
MCV RBC AUTO: 94 FL (ref 82.6–102.9)
MICROORGANISM SPEC CULT: NORMAL
MICROORGANISM SPEC CULT: NORMAL
MONOCYTES NFR BLD: 0.91 K/UL (ref 0.1–1.2)
MONOCYTES NFR BLD: 8 % (ref 3–12)
MRSA, DNA, NASAL: ABNORMAL
NEUTROPHILS NFR BLD: 75 % (ref 36–65)
NEUTS SEG NFR BLD: 8.21 K/UL (ref 1.5–8.1)
NRBC BLD-RTO: 0 PER 100 WBC
PLATELET # BLD AUTO: 335 K/UL (ref 138–453)
PMV BLD AUTO: 9.2 FL (ref 8.1–13.5)
POTASSIUM SERPL-SCNC: 4.2 MMOL/L (ref 3.7–5.3)
RBC # BLD AUTO: 2.85 M/UL (ref 3.95–5.11)
RBC # BLD: ABNORMAL 10*6/UL
SERVICE CMNT-IMP: NORMAL
SERVICE CMNT-IMP: NORMAL
SODIUM SERPL-SCNC: 126 MMOL/L (ref 135–144)
SPECIMEN DESCRIPTION: ABNORMAL
SPECIMEN DESCRIPTION: NORMAL
SPECIMEN DESCRIPTION: NORMAL
SURGICAL PATHOLOGY REPORT: NORMAL
WBC OTHER # BLD: 11 K/UL (ref 3.5–11.3)

## 2023-10-19 PROCEDURE — 82947 ASSAY GLUCOSE BLOOD QUANT: CPT

## 2023-10-19 PROCEDURE — 6370000000 HC RX 637 (ALT 250 FOR IP): Performed by: NURSE PRACTITIONER

## 2023-10-19 PROCEDURE — 71045 X-RAY EXAM CHEST 1 VIEW: CPT

## 2023-10-19 PROCEDURE — 97535 SELF CARE MNGMENT TRAINING: CPT

## 2023-10-19 PROCEDURE — 97110 THERAPEUTIC EXERCISES: CPT

## 2023-10-19 PROCEDURE — 6370000000 HC RX 637 (ALT 250 FOR IP): Performed by: STUDENT IN AN ORGANIZED HEALTH CARE EDUCATION/TRAINING PROGRAM

## 2023-10-19 PROCEDURE — 85025 COMPLETE CBC W/AUTO DIFF WBC: CPT

## 2023-10-19 PROCEDURE — 36415 COLL VENOUS BLD VENIPUNCTURE: CPT

## 2023-10-19 PROCEDURE — 36569 INSJ PICC 5 YR+ W/O IMAGING: CPT

## 2023-10-19 PROCEDURE — 80048 BASIC METABOLIC PNL TOTAL CA: CPT

## 2023-10-19 PROCEDURE — 2060000000 HC ICU INTERMEDIATE R&B

## 2023-10-19 PROCEDURE — APPSS30 APP SPLIT SHARED TIME 16-30 MINUTES: Performed by: NURSE PRACTITIONER

## 2023-10-19 PROCEDURE — C1751 CATH, INF, PER/CENT/MIDLINE: HCPCS

## 2023-10-19 PROCEDURE — 76937 US GUIDE VASCULAR ACCESS: CPT

## 2023-10-19 PROCEDURE — 97530 THERAPEUTIC ACTIVITIES: CPT

## 2023-10-19 PROCEDURE — 86140 C-REACTIVE PROTEIN: CPT

## 2023-10-19 PROCEDURE — 2580000003 HC RX 258: Performed by: NURSE PRACTITIONER

## 2023-10-19 PROCEDURE — 99232 SBSQ HOSP IP/OBS MODERATE 35: CPT | Performed by: NURSE PRACTITIONER

## 2023-10-19 PROCEDURE — 02HV33Z INSERTION OF INFUSION DEVICE INTO SUPERIOR VENA CAVA, PERCUTANEOUS APPROACH: ICD-10-PCS | Performed by: ORTHOPAEDIC SURGERY

## 2023-10-19 PROCEDURE — 6360000002 HC RX W HCPCS: Performed by: NURSE PRACTITIONER

## 2023-10-19 RX ORDER — AMLODIPINE BESYLATE 5 MG/1
5 TABLET ORAL DAILY
Status: DISCONTINUED | OUTPATIENT
Start: 2023-10-19 | End: 2023-10-20 | Stop reason: HOSPADM

## 2023-10-19 RX ORDER — CARVEDILOL 3.12 MG/1
3.12 TABLET ORAL 2 TIMES DAILY WITH MEALS
Status: DISCONTINUED | OUTPATIENT
Start: 2023-10-19 | End: 2023-10-20 | Stop reason: HOSPADM

## 2023-10-19 RX ADMIN — CARVEDILOL 3.12 MG: 3.12 TABLET, FILM COATED ORAL at 16:29

## 2023-10-19 RX ADMIN — DOCUSATE SODIUM 100 MG: 100 CAPSULE, LIQUID FILLED ORAL at 08:40

## 2023-10-19 RX ADMIN — TRAMADOL HYDROCHLORIDE 50 MG: 50 TABLET, COATED ORAL at 08:45

## 2023-10-19 RX ADMIN — TRAMADOL HYDROCHLORIDE 50 MG: 50 TABLET, COATED ORAL at 21:23

## 2023-10-19 RX ADMIN — ROPINIROLE HYDROCHLORIDE 1 MG: 1 TABLET, FILM COATED ORAL at 15:00

## 2023-10-19 RX ADMIN — METFORMIN HYDROCHLORIDE 1000 MG: 500 TABLET ORAL at 21:07

## 2023-10-19 RX ADMIN — INSULIN LISPRO 4 UNITS: 100 INJECTION, SOLUTION INTRAVENOUS; SUBCUTANEOUS at 21:07

## 2023-10-19 RX ADMIN — ATORVASTATIN CALCIUM 10 MG: 10 TABLET, FILM COATED ORAL at 08:39

## 2023-10-19 RX ADMIN — GABAPENTIN 300 MG: 300 CAPSULE ORAL at 21:07

## 2023-10-19 RX ADMIN — APIXABAN 5 MG: 5 TABLET, FILM COATED ORAL at 21:07

## 2023-10-19 RX ADMIN — GABAPENTIN 300 MG: 300 CAPSULE ORAL at 08:39

## 2023-10-19 RX ADMIN — INSULIN LISPRO 2 UNITS: 100 INJECTION, SOLUTION INTRAVENOUS; SUBCUTANEOUS at 12:00

## 2023-10-19 RX ADMIN — METFORMIN HYDROCHLORIDE 500 MG: 500 TABLET, EXTENDED RELEASE ORAL at 08:40

## 2023-10-19 RX ADMIN — CARVEDILOL 6.25 MG: 6.25 TABLET, FILM COATED ORAL at 08:40

## 2023-10-19 RX ADMIN — FOLIC ACID 1000 MCG: 1 TABLET ORAL at 08:41

## 2023-10-19 RX ADMIN — ROPINIROLE HYDROCHLORIDE 1 MG: 1 TABLET, FILM COATED ORAL at 08:41

## 2023-10-19 RX ADMIN — METOLAZONE 2.5 MG: 2.5 TABLET ORAL at 08:40

## 2023-10-19 RX ADMIN — TRAMADOL HYDROCHLORIDE 50 MG: 50 TABLET, COATED ORAL at 16:53

## 2023-10-19 RX ADMIN — AMLODIPINE BESYLATE 5 MG: 5 TABLET ORAL at 09:44

## 2023-10-19 RX ADMIN — INSULIN GLARGINE 20 UNITS: 100 INJECTION, SOLUTION SUBCUTANEOUS at 21:07

## 2023-10-19 RX ADMIN — GABAPENTIN 300 MG: 300 CAPSULE ORAL at 15:00

## 2023-10-19 RX ADMIN — ROPINIROLE HYDROCHLORIDE 1 MG: 1 TABLET, FILM COATED ORAL at 21:07

## 2023-10-19 RX ADMIN — APIXABAN 5 MG: 5 TABLET, FILM COATED ORAL at 08:40

## 2023-10-19 RX ADMIN — INSULIN LISPRO 4 UNITS: 100 INJECTION, SOLUTION INTRAVENOUS; SUBCUTANEOUS at 17:00

## 2023-10-19 RX ADMIN — VANCOMYCIN HYDROCHLORIDE 1000 MG: 1 INJECTION, POWDER, LYOPHILIZED, FOR SOLUTION INTRAVENOUS at 16:00

## 2023-10-19 RX ADMIN — FERROUS SULFATE TAB EC 325 MG (65 MG FE EQUIVALENT) 325 MG: 325 (65 FE) TABLET DELAYED RESPONSE at 08:40

## 2023-10-19 ASSESSMENT — PAIN SCALES - GENERAL
PAINLEVEL_OUTOF10: 4
PAINLEVEL_OUTOF10: 4
PAINLEVEL_OUTOF10: 5
PAINLEVEL_OUTOF10: 7

## 2023-10-19 ASSESSMENT — PAIN DESCRIPTION - ORIENTATION
ORIENTATION: LEFT
ORIENTATION: LEFT

## 2023-10-19 ASSESSMENT — PAIN DESCRIPTION - LOCATION
LOCATION: GENERALIZED
LOCATION: HIP
LOCATION: HIP

## 2023-10-19 ASSESSMENT — PAIN DESCRIPTION - DESCRIPTORS: DESCRIPTORS: ACHING

## 2023-10-19 NOTE — PROGRESS NOTES
1.0*  --  1.1* 1.0*   ANIONGAP 12  --  11 13   LABGLOM 54*  --  48* 54*   CALCIUM 8.7  --  8.8 9.0     Recent Labs     10/17/23  1924 10/18/23  0729 10/18/23  1135 10/18/23  1601 10/18/23  1946 10/19/23  0725   POCGLU 231* 182* 316* 226* 399* 139*     ABG:No results found for: \"POCPH\", \"PHART\", \"PH\", \"POCPCO2\", \"QDL7LJU\", \"PCO2\", \"POCPO2\", \"PO2ART\", \"PO2\", \"POCHCO3\", \"NHZ5IUT\", \"HCO3\", \"NBEA\", \"PBEA\", \"BEART\", \"BE\", \"THGBART\", \"THB\", \"FFT8DRR\", \"ZTKL3GWP\", \"X7EIXGIC\", \"O2SAT\", \"FIO2\"  Lab Results   Component Value Date/Time    SPECIAL LEFT HAND 6ML 10/14/2023 07:39 AM     Lab Results   Component Value Date/Time    CULTURE PENDING 10/16/2023 06:30 PM    CULTURE CULTURE IN PROGRESS 10/16/2023 06:30 PM       Radiology:  XR FEMUR LEFT (MIN 2 VIEWS)    Result Date: 10/17/2023  Postsurgical change. No complication. XR HIP 2-3 VW W PELVIS LEFT    Result Date: 10/17/2023  Postsurgical change. No complication. CT PELVIS WO CONTRAST Additional Contrast? None    Result Date: 10/14/2023  1. Bilateral total hip arthroplasties without displacement. 2.  Sherron-implant fractures left hip including the medial subtrochanteric femur and the greater tuberosity of the left femur dorsal aspect. 3.  Significant soft tissue swelling about the left hip. XR CHEST PORTABLE    Result Date: 10/14/2023  Cardiomegaly with left effusion, small and pulmonary vascular congestion. Superimposed pneumonitis difficult to exclude particularly in the left retrocardiac area. Physical Examination:        General appearance:  alert, tearful. Endorsing pain  Mental Status:  oriented to person, place and time. Lungs:  clear to auscultation bilaterally, normal effort  Heart:  regular rate and rhythm, no murmur  Abdomen:  soft, nontender, nondistended, normal bowel sounds, no masses, hepatomegaly, splenomegaly  Extremities:  no edema, redness, tenderness in the calves  Skin:  no gross lesions, rashes, induration. Left hip foam dressing CDI. transfer from assisted living to rehab area of [facility] when okay with ortho.      PUJA Espinoza NP  10/19/2023  7:35 AM

## 2023-10-19 NOTE — PROGRESS NOTES
Occupational Therapy  Facility/Department: [facility]  Occupational Daily Treatment Note    Name: Aramis Glaser  : 1934  MRN: [id number]  Date of Service: 10/19/2023    Discharge Recommendations:  Patient would benefit from continued therapy after discharge  Patient Diagnosis(es): The primary encounter diagnosis was Elevated brain natriuretic peptide (BNP) level. Diagnoses of Atypical atrial flutter ([address]), Atrial flutter, unspecified type ([address]), and Humberto-prosthetic femoral shaft fracture were also pertinent to this visit. Past Medical History:  has a past medical history of Atrial flutter ([address]), Diabetes mellitus ([address]), Hypercholesterolemia, Hypertension, and Sleep apnea. Past Surgical History:  has a past surgical history that includes Carpal tunnel release (Bilateral); Cholecystectomy; Cataract extraction (Bilateral); Hysterectomy; Abscess Drainage; Total hip arthroplasty (Bilateral); hip surgery (Left, 10/16/2023); and hip surgery (Left, 10/16/2023). Assessment   Performance deficits / Impairments: Decreased functional mobility ; Decreased endurance;Decreased ADL status; Decreased balance;Decreased high-level IADLs;Decreased strength  Prognosis: Good  Activity Tolerance  Activity Tolerance: Patient Tolerated treatment well        Plan   Occupational Therapy Plan  Times Per Week: 4-5x     Restrictions  Restrictions/Precautions  Restrictions/Precautions: Weight Bearing, Fall Risk, Up as Tolerated, Surgical Protocols  Required Braces or Orthoses?: No  Lower Extremity Weight Bearing Restrictions  Left Lower Extremity Weight Bearing: Weight Bearing As Tolerated  Position Activity Restriction  Other position/activity restrictions: pt has spacer L hip--per ortho, WBAT LLE, L hip drain  Pain assessment: Pt c/o pain L hip /10  Pain intervention: Repositioned  Subjective   General  Patient assessed for rehabilitation services?: Yes  Family / Caregiver Present: No  Diagnosis: L humberto-implant fx, spacer/ORIF of

## 2023-10-19 NOTE — PROCEDURES
Picc placement order:    Benefits include stable, long term intravenous access. Blood draws. Peripheral vein preservation. Safely deliver vesicant medications. Risks include failure to obtain the desired result(s) of the procedure, discomfort, injury, the need for additional procedures/therapies, permanent loss of body function, bleeding/bruising, arterial puncture, air embolism, nerve damage, hematoma, phlebitis, catheter fracture/rupture, catheter embolism, catheter occlusion, catheter migration, catheter site infection, unintentional/accidental removal of catheter, bloodstream infection, infiltration, cardiac arrhythmia, vein thrombosis, difficult catheter removal.   Alternatives discussed including centrally inserted central catheter, as well as less invasive procedures such as multiple peripheral IVs, extended dwell catheters and midline placements. Consent obtained by proceduralist, signed by Patient/DPOA. Prescribed therapy: Vanco q 24 hr for 4 weeks   Peripheral ultrasound assessment done. Plan for right brachial  Vein measurement = .45 cm and area based CVR= 8.6%, preferable CVR based on area would be less than 20% (which correlates to less than 45% linear CVR). Product type: Arrow non tapered power injectable PICC  History/Labs/Allergies Reviewed  Placed By: [facility] IV Team  Assistant: Ciera Tee  Time out Performed using Two Identifiers  Lot #: [id number]  Expiration date: 01/31/2024  Catheter info: 4 Serbian - single  Total length: 38cm  External catheter length: 0cm  Extremity circumference at site = 28cm  Number of attempts: 1  Estimated blood loss: 1 ml  Placement verified by: positive blood return & flushes easily  Special equipment used: [employer] ECG Tip tracker system, ultrasound, MST, and micro-introducer needle.    Catheter securement: Adhesive T1 Visions securement device  Catheter securement adhesive (SecureportIV) utilized at insertion site  Dressing applied: Tegaderm CHG  Lidocaine

## 2023-10-19 NOTE — PROGRESS NOTES
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 Stop date 11-16-23  Blood cultures 10/14/23: No growth   Follow up on tissue culture 10/16/23, no growth  MRSA screen : pending    Medical Decision Making/Summary/Discussion:10/19/2023       Infection Control Recommendations   [place] Precautions    Antimicrobial Stewardship Recommendations     Simplification of therapy  Targeted therapy  PK dosing    Coordination of Outpatient Care:   Estimated Length of IV antimicrobials:TBD  Patient will need Midline Catheter Insertion: TBD  Patient will need PICC line Insertion:TBD  Patient will need: Home IV , [address] [place],  SNF,  LTAC: TBD  Patient will need outpatient wound care: Yes    Chief complaint/reason for consultation:   Concern with prosthetic Lt hip infection      History of Present Illness:   Myrtle Le is a 80y.o.-year-old  female who was initially admitted on 10/13/2023. Patient seen at the request of Lawyer Cortez. INITIAL HISTORY:    Elderly patient who resides in an assisted living facility. She apparently fell twice in the last 2 days and has been unable to bear weight on her left leg since her last fall. She was taken to [facility] where X rays showed a non displaced intertrochanteric fracture.   In addition she was found to have

## 2023-10-19 NOTE — CARE COORDINATION
Transitional planning    Call from [facility] admissions, given update , precert good until Saturday.

## 2023-10-19 NOTE — CARE COORDINATION
PS to Ankur Le NP for discharge order when pt is ready for discharge so HENs can be complete and transportation secured.      1031 Per Ankur Le NP she has to verify plan of care with ortho prior to discharging, transportation remains on will call HENS complete    1141 Per [place] the patient is not ready to be discharged per ortho as the drain is sutured in and they would like the pt here until the drai is pulled, request for Discharge order to be removed, [facility] notified, [address] admissions updated

## 2023-10-19 NOTE — PLAN OF CARE
Problem: Discharge Planning  Goal: Discharge to home or other facility with appropriate resources  Outcome: Progressing  Flowsheets (Taken 10/18/2023 1950)  Discharge to home or other facility with appropriate resources: Identify barriers to discharge with patient and caregiver     Problem: Skin/Tissue Integrity  Goal: Absence of new skin breakdown  Description: 1. Monitor for areas of redness and/or skin breakdown  2. Assess vascular access sites hourly  3. Every 4-6 hours minimum:  Change oxygen saturation probe site  4. Every 4-6 hours:  If on nasal continuous positive airway pressure, respiratory therapy assess nares and determine need for appliance change or resting period.   Outcome: Progressing     Problem: Safety - Adult  Goal: Free from fall injury  Outcome: Progressing  Flowsheets (Taken 10/18/2023 2000)  Free From Fall Injury: Instruct family/caregiver on patient safety     Problem: ABCDS Injury Assessment  Goal: Absence of physical injury  Outcome: Progressing  Flowsheets (Taken 10/18/2023 2000)  Absence of Physical Injury: Implement safety measures based on patient assessment     Problem: Pain  Goal: Verbalizes/displays adequate comfort level or baseline comfort level  Outcome: Progressing  Flowsheets  Taken 10/18/2023 2333  Verbalizes/displays adequate comfort level or baseline comfort level: Encourage patient to monitor pain and request assistance  Taken 10/18/2023 1930  Verbalizes/displays adequate comfort level or baseline comfort level: Encourage patient to monitor pain and request assistance     Problem: Chronic Conditions and Co-morbidities  Goal: Patient's chronic conditions and co-morbidity symptoms are monitored and maintained or improved  Outcome: Progressing  Flowsheets (Taken 10/18/2023 1950)  Care Plan - Patient's Chronic Conditions and Co-Morbidity Symptoms are Monitored and Maintained or Improved: Monitor and assess patient's chronic conditions and comorbid symptoms for stability, DISPLAY PLAN FREE TEXT

## 2023-10-19 NOTE — PROGRESS NOTES
Comprehensive Nutrition Assessment    Type and Reason for Visit:  RD Nutrition Re-Screen/LOS    Nutrition Recommendations/Plan:   Continue current diet, CCHO-Medium  Will send Diabetic ONS BID  Monitor/encourage/assist with PO intake     Malnutrition Assessment:  Malnutrition Status:  No malnutrition (10/19/23 1059)    Context:  Acute Illness     Findings of the 6 clinical characteristics of malnutrition:  Energy Intake:  Mild decrease in energy intake (Comment)  Weight Loss:  Unable to assess     Body Fat Loss:  No significant body fat loss     Muscle Mass Loss:  No significant muscle mass loss    Fluid Accumulation:  No significant fluid accumulation     Strength:  Not Performed    Nutrition Assessment:    Pt seen for LOS. 79 yo F adm L hip fx. PMH significant for DM, HTN, CHF. 79 yo F adm L hip fx. PMH significant for DM, HTN, CHF. Pt s/p OR for hip repair (10/16). Pt endorses fair appetite, states she does better when she has assitance and when food is hot. Pt endorses ~25% PO intake at breakfast (oatmeal and fruit), states she is not typically a big breakfast eater. Per chart, PO intake 26-51% of meals noted. Pt notes she is [place] and has difficulty seeing. Pt is agreeable to ONS, prefers fruit flavors. Discussed need for pt assistance with RN, who noted pt need help opening all tray items and assistance with positioning, but does well with eating once set-up. Encouraged RN to pass need for assistance along to other RN's. No BM noted, Colace noted. No edema noted. No wt history per chart. Nutrition Related Findings:    labs/meds reviewed Wound Type: Surgical Incision (to L hip)       Current Nutrition Intake & Therapies:    Average Meal Intake: 26-50%  Average Supplements Intake: None Ordered  ADULT DIET;  Regular; 4 carb choices (60 gm/meal)  ADULT ORAL NUTRITION SUPPLEMENT; Breakfast, Dinner; Diabetic Oral Supplement    Anthropometric Measures:  Height: 157.5 cm (5' 2\")  Ideal Body Weight (IBW): 110 lbs

## 2023-10-19 NOTE — PROGRESS NOTES
Physical Therapy  Facility/Department: [facility]  Physical Therapy Daily Treatment Note    Name: Sharron Noyola  : 1934  MRN: [id number]  Date of Service: 10/19/2023    Discharge Recommendations:  Patient would benefit from continued therapy after discharge   PT Equipment Recommendations  Equipment Needed: No      Patient Diagnosis(es): The primary encounter diagnosis was Elevated brain natriuretic peptide (BNP) level. Diagnoses of Atypical atrial flutter ([address]), Atrial flutter, unspecified type ([address]), and Sherron-prosthetic femoral shaft fracture were also pertinent to this visit. Past Medical History:  has a past medical history of Atrial flutter ([address]), Diabetes mellitus ([address]), Hypercholesterolemia, Hypertension, and Sleep apnea. Past Surgical History:  has a past surgical history that includes Carpal tunnel release (Bilateral); Cholecystectomy; Cataract extraction (Bilateral); Hysterectomy; Abscess Drainage; Total hip arthroplasty (Bilateral); hip surgery (Left, 10/16/2023); and hip surgery (Left, 10/16/2023). Assessment   Body Structures, Functions, Activity Limitations Requiring Skilled Therapeutic Intervention: Decreased functional mobility ; Decreased ROM; Decreased body mechanics; Decreased strength;Decreased safe awareness;Decreased cognition;Decreased balance; Increased pain;Decreased posture  Assessment: Pt required modA to perform bed mobility and CGA x2 to perform STS transfers with RW. Pt only able to tolerate taking 1 very small shuffled step to the R d/t difficulty advancing LEs secondary to weakness. Recommending continued PT to address deficits and maximize safety and independence with mobility. Therapy Prognosis: Fair  Requires PT Follow-Up: Yes  Activity Tolerance  Activity Tolerance: Patient limited by pain; Patient limited by endurance     Plan   Physcial Therapy Plan  General Plan:  (5-6x/week)  Current Treatment Recommendations: Strengthening, Balance training, Functional mobility

## 2023-10-19 NOTE — PROGRESS NOTES
Resident to bedside for dressing change to patients left leg surgical site. Dressing is saturated with ss fluid. Tubing to drain had a clot on the end and caused the blood to back up into dressing. Clot removed and drain functioning without further difficulty.

## 2023-10-19 NOTE — DISCHARGE INSTR - COC
Bowel: Yes  Bladder: Yes  Urinary Catheter: None   Colostomy/Ileostomy/Ileal Conduit: No       Date of Last BM: 10/19/2023    Intake/Output Summary (Last 24 hours) at 10/19/2023 0925  Last data filed at 10/19/2023 3578  Gross per 24 hour   Intake 247.75 ml   Output 1055 ml   Net -807.25 ml     I/O last 3 completed shifts:  In: -   Out: 3460 [Urine:3250; Drains:210]    Safety Concerns:     History of Falls (last 30 days) and At Risk for Falls    Impairments/Disabilities:      Vision and Hearing    Nutrition Therapy:  Current Nutrition Therapy:   - Oral Diet:  General and Carb Control 4 carbs/meal (1800kcals/day)    Routes of Feeding: Oral  Liquids: Thin Liquids  Daily Fluid Restriction: no  Last Modified Barium Swallow with Video (Video Swallowing Test): not done    Treatments at the Time of Hospital Discharge:   Respiratory Treatments: NA  Oxygen Therapy:  is not on home oxygen therapy.   Ventilator:    - No ventilator support    Rehab Therapies: Physical Therapy and Occupational Therapy  Weight Bearing Status/Restrictions: Partial weight bearing (30-50%) only on leg left leg  Other Medical Equipment (for information only, NOT a DME order):  wheelchair, walker, bedside commode, and hospital bed  Other Treatments: IV antibiotic therapy     Patient's personal belongings (please select all that are sent with patient):  Glasses    RN SIGNATURE:  Electronically signed by Blanca Briggs RN on 10/19/23 at 9:28 AM EDT    CASE MANAGEMENT/SOCIAL WORK SECTION    Inpatient Status Date: ***    Readmission Risk Assessment Score:  Readmission Risk              Risk of Unplanned Readmission:  17           Discharging to Facility/ Agency   Name: Anika Daniel  Address:  Phone:  Fax:    Dialysis Facility (if applicable)   Name:  Address:  Dialysis Schedule:  Phone:  Fax:    / signature: Electronically signed by Jocelin Montesinos RN on 10/20/23 at 10:29 AM EDT    PHYSICIAN SECTION    Prognosis:

## 2023-10-20 VITALS
TEMPERATURE: 98.2 F | OXYGEN SATURATION: 97 % | SYSTOLIC BLOOD PRESSURE: 112 MMHG | HEART RATE: 46 BPM | DIASTOLIC BLOOD PRESSURE: 54 MMHG | BODY MASS INDEX: 31.6 KG/M2 | RESPIRATION RATE: 16 BRPM | HEIGHT: 62 IN | WEIGHT: 171.74 LBS

## 2023-10-20 LAB
GLUCOSE BLD-MCNC: 116 MG/DL (ref 65–105)
GLUCOSE BLD-MCNC: 169 MG/DL (ref 65–105)

## 2023-10-20 PROCEDURE — 6370000000 HC RX 637 (ALT 250 FOR IP): Performed by: STUDENT IN AN ORGANIZED HEALTH CARE EDUCATION/TRAINING PROGRAM

## 2023-10-20 PROCEDURE — 99238 HOSP IP/OBS DSCHRG MGMT 30/<: CPT | Performed by: NURSE PRACTITIONER

## 2023-10-20 PROCEDURE — 6370000000 HC RX 637 (ALT 250 FOR IP): Performed by: NURSE PRACTITIONER

## 2023-10-20 PROCEDURE — 82947 ASSAY GLUCOSE BLOOD QUANT: CPT

## 2023-10-20 RX ORDER — INSULIN GLARGINE 100 [IU]/ML
20 INJECTION, SOLUTION SUBCUTANEOUS NIGHTLY
Qty: 10 ML | Refills: 3 | DISCHARGE
Start: 2023-10-20

## 2023-10-20 RX ORDER — CARVEDILOL 3.12 MG/1
3.12 TABLET ORAL 2 TIMES DAILY WITH MEALS
Qty: 60 TABLET | Refills: 3 | DISCHARGE
Start: 2023-10-20

## 2023-10-20 RX ORDER — PSEUDOEPHEDRINE HCL 30 MG
100 TABLET ORAL DAILY
Qty: 90 CAPSULE | Refills: 1 | DISCHARGE
Start: 2023-10-21

## 2023-10-20 RX ORDER — AMLODIPINE BESYLATE 5 MG/1
5 TABLET ORAL DAILY
Qty: 30 TABLET | Refills: 3 | DISCHARGE
Start: 2023-10-21

## 2023-10-20 RX ADMIN — ROPINIROLE HYDROCHLORIDE 1 MG: 1 TABLET, FILM COATED ORAL at 08:36

## 2023-10-20 RX ADMIN — APIXABAN 5 MG: 5 TABLET, FILM COATED ORAL at 08:36

## 2023-10-20 RX ADMIN — ATORVASTATIN CALCIUM 10 MG: 10 TABLET, FILM COATED ORAL at 08:36

## 2023-10-20 RX ADMIN — TRAMADOL HYDROCHLORIDE 50 MG: 50 TABLET, COATED ORAL at 09:11

## 2023-10-20 RX ADMIN — GABAPENTIN 300 MG: 300 CAPSULE ORAL at 08:36

## 2023-10-20 RX ADMIN — METFORMIN HYDROCHLORIDE 500 MG: 500 TABLET, EXTENDED RELEASE ORAL at 08:36

## 2023-10-20 RX ADMIN — METOLAZONE 2.5 MG: 2.5 TABLET ORAL at 08:35

## 2023-10-20 RX ADMIN — DOCUSATE SODIUM 100 MG: 100 CAPSULE, LIQUID FILLED ORAL at 08:35

## 2023-10-20 RX ADMIN — FOLIC ACID 1000 MCG: 1 TABLET ORAL at 08:36

## 2023-10-20 RX ADMIN — FERROUS SULFATE TAB EC 325 MG (65 MG FE EQUIVALENT) 325 MG: 325 (65 FE) TABLET DELAYED RESPONSE at 08:36

## 2023-10-20 RX ADMIN — AMLODIPINE BESYLATE 5 MG: 5 TABLET ORAL at 08:36

## 2023-10-20 RX ADMIN — CARVEDILOL 3.12 MG: 3.12 TABLET, FILM COATED ORAL at 08:36

## 2023-10-20 ASSESSMENT — PAIN DESCRIPTION - ORIENTATION: ORIENTATION: LEFT

## 2023-10-20 ASSESSMENT — PAIN SCALES - GENERAL: PAINLEVEL_OUTOF10: 8

## 2023-10-20 ASSESSMENT — PAIN DESCRIPTION - LOCATION: LOCATION: HIP

## 2023-10-20 ASSESSMENT — PAIN DESCRIPTION - DESCRIPTORS: DESCRIPTORS: ACHING;DISCOMFORT;SORE;TENDER

## 2023-10-20 NOTE — PROGRESS NOTES
[facility]  Office: [id number], DO, Ken Fagan, DO, Jaspreet Iqbal, DO, Mckenzie Landis, DO, Bird Angeles MD, Haroon Dee MD, Arturo Beard MD, Pili Theodore MD,  Brittany Awad MD, Georgette Schmitt MD, Mack Downey, DO, Hal Stephens MD,  Virgie Sunshine MD, Johanna Medley MD, Quincy Hamilton DO, Pete Iverson MD,  Nell Joel DO, Marleni Tobin MD, Natalio Schumacher MD, Fabian Navarrete MD, Biju Guevara MD,  Amanda Villasenor MD, Jaja Verma MD, Garfield Hutchison MD, Zelpha Paget, MD, Bobbi Farias DO, Heidy Mccrary MD,  Barbara Funes MD, Marly Villagomez MD, Dimple Theodore, CNP,  Natalie Butler, CNP,, Chin Shannon, CNP,  Rachel Barclay, [facility], Beck Waddell, CNP, Bill Palafox, CNP, Sarina Polk, CNP, Kartik Zhou, CNP, Guera Tran, CNP, Se Gill, CNP, Derrek Buckner, CNS, Krystyna Mejia, CNP, David Hancock, [address] [place] [address] Balta Gonsalez    Progress Note    10/20/2023    7:49 AM    Name:   Antionette Ponce  MRN:     [id number]     Acct:      [de-identified]   Room:   [address] [place] [address] Day:  7  Admit Date:  10/13/2023  9:05 PM    PCP:   No primary care provider on file. Code Status:  DNR-CCA    Subjective:     C/C: No chief complaint on file. ***  Interval History Status: {IMPROVED/NOCHANGE/WORSE:78801}. ***    Brief History:     ***    Review of Systems:     Constitutional:  negative for chills, fevers, sweats  Respiratory:  negative for cough, dyspnea on exertion, shortness of breath, wheezing  Cardiovascular:  negative for chest pain, chest pressure/discomfort, lower extremity edema, palpitations  Gastrointestinal:  negative for abdominal pain, constipation, diarrhea, nausea, vomiting  Neurological:  negative for dizziness, headache    Medications:      Allergies:  No Known Allergies    Current Meds:   Scheduled Meds:    amLODIPine  5 mg Oral Daily    influenza virus vaccine  0.5

## 2023-10-20 NOTE — PLAN OF CARE
Problem: Discharge Planning  Goal: Discharge to home or other facility with appropriate resources  10/20/2023 1036 by Jaya Guzman RN  Outcome: Progressing     Problem: Skin/Tissue Integrity  Goal: Absence of new skin breakdown  Description: 1. Monitor for areas of redness and/or skin breakdown  2. Assess vascular access sites hourly  3. Every 4-6 hours minimum:  Change oxygen saturation probe site  4. Every 4-6 hours:  If on nasal continuous positive airway pressure, respiratory therapy assess nares and determine need for appliance change or resting period.   10/20/2023 1036 by Jaya Guzman RN  Outcome: Progressing     Problem: Safety - Adult  Goal: Free from fall injury  10/20/2023 1036 by Jaya Guzman RN  Outcome: Progressing     Problem: Pain  Goal: Verbalizes/displays adequate comfort level or baseline comfort level  10/20/2023 1036 by Jaya Guzman RN  Outcome: Progressing

## 2023-10-20 NOTE — PROGRESS NOTES
Pt discharged;transport at bedside.   Report given to transport;writer called report to [facility] [place] at [facility];questions/concerns addressed;writer informed [facility] [place] of the atbs & PICC placement;Lenka verbalized understanding to discharge instructions

## 2023-10-20 NOTE — PROGRESS NOTES
Osteopenia is noted. Moderate arthritic changes are present in the SI joints. Soft Tissue: Significant soft tissue swelling about the left hip is noted. Some air is present in the soft tissues at the edge of the field of view of lateral aspect of the left hip. Pelvic contents demonstrate no acute abnormality. Scattered diverticula are present. Joint: Arthroplastic joints are in satisfactory alignment without dislocation. 1.  Bilateral total hip arthroplasties without displacement. 2.  Sherron-implant fractures left hip including the medial subtrochanteric femur and the greater tuberosity of the left femur dorsal aspect. 3.  Significant soft tissue swelling about the left hip. XR CHEST PORTABLE    Result Date: 10/14/2023  EXAMINATION: ONE XRAY VIEW OF THE CHEST 10/14/2023 6:08 am COMPARISON: None HISTORY: ORDERING SYSTEM PROVIDED HISTORY: Hypoxia TECHNOLOGIST PROVIDED HISTORY: Hypoxia FINDINGS: AP portable view of the chest time stamped at 620 hours demonstrates overlying cardiac monitoring electrodes, moderate cardiomegaly, small left effusion and appearance vascular congestion without extrapleural air. Small granuloma in the left upper lung field is noted. Paired intraspinal electrodes terminate in the midthoracic area. Cardiomegaly with left effusion, small and pulmonary vascular congestion. Superimposed pneumonitis difficult to exclude particularly in the left retrocardiac area. Medical Decision Ciqgin-Ybcbkdey-Vvlrc:     Results       Procedure Component Value Units Date/Time    MRSA DNA Probe, Nasal [[id number]]  (Abnormal) Collected: 10/18/23 1615    Order Status: Completed Specimen: Nasal Updated: 10/19/23 1154     Specimen Description . NASAL SWAB     MRSA, DNA, Nasal POSITIVE:  MRSA DNA detected by nucleic acid amplification.      Comment:                                                   Results should be used as an adjunct to nosocomial control efforts to identify patients   needing enhanced precautions. The test is not intended to identify patients with staphylococcal infections. Results   should not be used to guide or monitor treatment for MRSA infections. Culture, Anaerobic and Aerobic [[id number]] Collected: 10/16/23 1831    Order Status: Canceled Specimen: Tissue from Hip     Culture, Fungus [[id number]] Collected: 10/16/23 1830    Order Status: Completed Specimen: Tissue from Hip Updated: 10/17/23 1148     Specimen Description . HIP LEFT     Direct Exam NO FUNGAL ELEMENTS SEEN     Culture PENDING    Culture, Tissue [[id number]]  (Abnormal) Collected: 10/16/23 1830    Order Status: Completed Specimen: Tissue from Hip Updated: 10/20/23 0751     Specimen Description . HIP LEFT     Direct Exam FEW NEUTROPHILS      NO ORGANISMS SEEN     Culture PSEUDOMONAS AERUGINOSA One colony      No anaerobic organisms isolated at 4 days. Culture, Wound Aerobic Only [[id number]] Collected: 10/16/23 1827    Order Status: Canceled Specimen: Swab from Hip     Culture, Anaerobic and Aerobic [[id number]]  (Abnormal) Collected: 10/16/23 1825    Order Status: Completed Specimen: Swab from Hip Updated: 10/20/23 0752     Specimen Description . HIP LEFT     Direct Exam FEW NEUTROPHILS      NO ORGANISMS SEEN     Culture NO GROWTH 4 DAYS    Culture, Blood 1 [[id number]] Collected: 10/14/23 0739    Order Status: Completed Specimen: Blood Updated: 10/19/23 0808     Specimen Description . BLOOD     Special Requests LEFT HAND 6ML     Culture NO GROWTH 5 DAYS    Culture, Blood 1 [[id number]] Collected: 10/14/23 0725    Order Status: Completed Specimen: Blood Updated: 10/19/23 0815     Specimen Description . BLOOD     Special Requests RIGHT FOREARM 3.5ML     Culture NO GROWTH 5 DAYS              Medical Decision Making-Other:     Note:  Pool Segura MD  Internal Medicine Resident, PGY-1  [place], [place]  10/20/2023, 8:55 AM

## 2023-10-20 NOTE — DISCHARGE INSTR - DIET

## 2023-10-20 NOTE — CARE COORDINATION
Received call from patient's son Cristhian Jin asking for update on facility placement. CM informed son that patient has been approved to [facility] and will be transported today at noon per notes from other CM in patient's chart.

## 2023-10-20 NOTE — PLAN OF CARE
Problem: Discharge Planning  Goal: Discharge to home or other facility with appropriate resources  Outcome: Progressing  Flowsheets (Taken 10/19/2023 2115)  Discharge to home or other facility with appropriate resources:   Identify barriers to discharge with patient and caregiver   Arrange for needed discharge resources and transportation as appropriate   Identify discharge learning needs (meds, wound care, etc)   Refer to discharge planning if patient needs post-hospital services based on physician order or complex needs related to functional status, cognitive ability or social support system     Problem: Safety - Adult  Goal: Free from fall injury  Outcome: Progressing  Flowsheets (Taken 10/19/2023 2100)  Free From Fall Injury: Instruct family/caregiver on patient safety     Problem: Pain  Goal: Verbalizes/displays adequate comfort level or baseline comfort level  Outcome: Progressing  Flowsheets (Taken 10/19/2023 1700 by Liudmila Keita RN)  Verbalizes/displays adequate comfort level or baseline comfort level:   Encourage patient to monitor pain and request assistance   Assess pain using appropriate pain scale   Administer analgesics based on type and severity of pain and evaluate response     Problem: Chronic Conditions and Co-morbidities  Goal: Patient's chronic conditions and co-morbidity symptoms are monitored and maintained or improved  Outcome: Progressing  Flowsheets (Taken 10/19/2023 2115)  Care Plan - Patient's Chronic Conditions and Co-Morbidity Symptoms are Monitored and Maintained or Improved:   Monitor and assess patient's chronic conditions and comorbid symptoms for stability, deterioration, or improvement   Collaborate with multidisciplinary team to address chronic and comorbid conditions and prevent exacerbation or deterioration   Update acute care plan with appropriate goals if chronic or comorbid symptoms are exacerbated and prevent overall improvement and discharge     Problem: Skin/Tissue

## 2023-10-20 NOTE — CARE COORDINATION
Transitional Planning:  Informed Pee Knutson at [facility] that patient is ready for discharge. Drain out. She was informed that transport is arranged for noon. Report:  [phone]   Fax:  [phone]    11:37  AVS faxed to [facility].

## 2023-10-21 PROBLEM — Z96.649 PERI-PROSTHETIC FEMORAL SHAFT FRACTURE: Status: ACTIVE | Noted: 2023-10-13

## 2023-10-21 PROBLEM — T84.59XA PROSTHETIC HIP INFECTION (HCC): Status: ACTIVE | Noted: 2023-10-21

## 2023-10-21 PROBLEM — M97.8XXA PERI-PROSTHETIC FEMORAL SHAFT FRACTURE: Status: ACTIVE | Noted: 2023-10-13

## 2023-10-21 PROBLEM — Z96.649 PROSTHETIC HIP INFECTION (HCC): Status: ACTIVE | Noted: 2023-10-21

## 2023-10-21 LAB
MICROORGANISM SPEC CULT: ABNORMAL
MICROORGANISM/AGENT SPEC: ABNORMAL
SPECIMEN DESCRIPTION: ABNORMAL
SPECIMEN DESCRIPTION: ABNORMAL

## 2023-10-21 NOTE — OP NOTE
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  Left periprosthetic proximal femur fracture       Procedure(s):  Left hip irrigation and debridement down to bone 70366  Left total hip hardware explantation with insertion of antibiotic spacer 56086  Insertion of antibiotic stimulan beads left hip 20700  Open treatment humberto-prosthetic left femur fracture 58733    Surgeon(s):  Altamease Kocher, DO    Assistant:  Resident: Neetu Cleveland RockDO june    Anesthesia: General    Estimated Blood Loss (mL): 200     Fluids: 2200mL crystalloids    Complications: None    Specimens:   ID Type Source Tests Collected by Time Destination   1 : LEFT HIP Swab Hip CULTURE, WOUND (Canceled), CULTURE, ANAEROBIC AND AEROBIC Johnny MIDDLETON DO 10/16/2023 1745    A : LEFT HIP Tissue Hip SURGICAL PATHOLOGY, CULTURE, FUNGUS, CULTURE, TISSUE, CULTURE, ANAEROBIC AND AEROBIC (Canceled) Altamease Kocher, DO 10/16/2023 1812        Implants:  Osteoremidies 54 mm cup, 46 mm head, medium long stem  2 mm cable x 2    Indications: This is a 80 y.o. female who sustained a left prosthetic hip infection as well as a minimally displaced periprosthetic proximal femur fracture. Treatment options included debridement and explant of prosthesis plus or minus placement of antibiotic cement spacer. We discussed the nature of the injury as well as the indications for surgical intervention as well as the associated risks, benefits, and alternatives of the procedure.  The patient and family stated clear understanding, was willing to proceed with debridement and explant along with the placement of an antibiotic cement spacer as they felt she remained too active for an explant alone, provided written informed consent, and had her

## 2023-10-23 LAB
MICROORGANISM SPEC CULT: NORMAL
MICROORGANISM/AGENT SPEC: NORMAL
SPECIMEN DESCRIPTION: NORMAL

## 2023-10-25 ENCOUNTER — OFFICE VISIT (OUTPATIENT)
Dept: ORTHOPEDIC SURGERY | Age: 88
End: 2023-10-25

## 2023-10-25 VITALS — HEIGHT: 62 IN | BODY MASS INDEX: 31.47 KG/M2 | WEIGHT: 171 LBS

## 2023-10-25 DIAGNOSIS — Z96.649 INFECTION OF PROSTHETIC HIP JOINT, SUBSEQUENT ENCOUNTER: Primary | ICD-10-CM

## 2023-10-25 DIAGNOSIS — T84.59XD INFECTION OF PROSTHETIC HIP JOINT, SUBSEQUENT ENCOUNTER: Primary | ICD-10-CM

## 2023-10-25 PROCEDURE — 99024 POSTOP FOLLOW-UP VISIT: CPT | Performed by: NURSE PRACTITIONER

## 2023-10-25 NOTE — PROGRESS NOTES
the distal aspect of incision where previous drain was in place with large amount of serous drainage. TTP about the hip. Compartments soft. 2+ DP pulse. TA/EHL/FHL/GS motor intact. Deep and Superficial Peroneal/Saphenous/Sural SILT. Radiology:  No radiographs obtained today. Assessment:   80y.o. year old female with left hip prosthetic joint infection and loosening with left periprosthetic proximal femur fracture status post left hip irrigation and debridement, left total hip hardware explantation with insertion of antibiotic spacer and open treatment of periprosthetic femur fracture; DOS: 10/16/2023. Plan:   -Graeme Dubs wound VAC applied to the left hip incision. Vac supplies/ provided to patient to take back to nursing facility.    -Continue weightbearing as tolerated to left lower extremity with use of assistive devices.    -Continue IV antibiotics per infectious diseases and follow-up as instructed with Dr. Jamel Friend.     - Plan to follow up in 1 week for wound check. Follow up:No follow-ups on file. No orders of the defined types were placed in this encounter. No orders of the defined types were placed in this encounter. Electronically signed by PUJA Bowers CNP on 10/25/2023 at 1:46 PM    This note is created with the assistance of a speech recognition program.  While intending to generate a document that actually reflects the content of the visit, the document can still have some errors including those of syntax and sound a like substitutions which may escape proof reading.   In such instances, actual meaning can be extrapolated by contextual diversion

## 2023-10-26 NOTE — DISCHARGE SUMMARY
CONSULT TO INFECTIOUS DISEASES  IP CONSULT TO IV TEAM      The patient was seen and examined on day of discharge and this discharge summary is in conjunction with any daily progress note from day of discharge. Discharge plan:     Disposition: Rehab facility    Physician Follow Up:     Linaet Fuentes, [address] [id number] [phone]    Follow up in 1 month(s)    Follow-up care with orthopedic team in 2 weeks. Facility to call for appointment. Requiring Further Evaluation/Follow Up POST HOSPITALIZATION/Incidental Findings: Final wound cultures pending. Diet: regular diet    Activity: As tolerated    Instructions to Patient: Please return to the hospital for any worsening changes in your condition or concerning changes in your health. Discharge Medications:      Medication List        START taking these medications      amLODIPine 5 MG tablet  Commonly known as: NORVASC  Take 1 tablet by mouth daily     carvedilol 3.125 MG tablet  Commonly known as: COREG  Take 1 tablet by mouth 2 times daily (with meals) Hold for HR < 60     cefepime  infusion  Commonly known as: MAXIPIME  Infuse 2,000 mg intravenously in the morning and 2,000 mg in the evening. Do all this for 26 days. Compound per protocol. docusate 100 MG Caps  Commonly known as: COLACE, DULCOLAX  Take 100 mg by mouth daily     insulin glargine 100 UNIT/ML injection vial  Commonly known as: LANTUS  Inject 20 Units into the skin nightly     vancomycin  infusion  Commonly known as: VANCOCIN  Infuse 1,000 mg intravenously every 24 hours for 23 days Compound per protocol.      vitamin D 1.25 MG (34565 UT) Caps capsule  Commonly known as: ERGOCALCIFEROL  Take 1 capsule by mouth once a week            CONTINUE taking these medications      Eliquis 5 MG Tabs tablet  Generic drug: apixaban     ferrous sulfate 325 (65 Fe) MG tablet  Commonly known as: IRON 888     folic acid 182 MCG tablet  Commonly known as: [employer]

## 2023-10-30 LAB
MICROORGANISM SPEC CULT: NORMAL
MICROORGANISM/AGENT SPEC: NORMAL
SPECIMEN DESCRIPTION: NORMAL

## 2023-11-06 LAB
MICROORGANISM SPEC CULT: NORMAL
MICROORGANISM/AGENT SPEC: NORMAL
SPECIMEN DESCRIPTION: NORMAL

## 2023-11-13 LAB
MICROORGANISM SPEC CULT: NORMAL
MICROORGANISM/AGENT SPEC: NORMAL
SPECIMEN DESCRIPTION: NORMAL

## (undated) DEVICE — DRESSING FOAM W4XL10IN AG SIL ADH ANTIMIC POSTOP OPTIFOAM

## (undated) DEVICE — 3M™ IOBAN™ 2 ANTIMICROBIAL INCISE DRAPE 6650EZ: Brand: IOBAN™ 2

## (undated) DEVICE — DRESSING FOAM W4XL4IN AG SIL FACE BORD IONIC ANTIMIC ADH

## (undated) DEVICE — COVER US PRB W15XL120CM W/ GEL RUBBERBAND TAPE STRP FLD GEN

## (undated) DEVICE — 1LYRTR 16FR10ML100%SIL UMS SNP: Brand: MEDLINE INDUSTRIES, INC.

## (undated) DEVICE — SHOULDER STABILIZATION KIT,                                    DISPOSABLE 12 PER BOX

## (undated) DEVICE — 6619 2 PTNT ISO SYS INCISE AREA&LT;(&GT;&&LT;)&GT;P: Brand: STERI-DRAPE™ IOBAN™ 2

## (undated) DEVICE — STOCKINETTE,IMPERVIOUS,12X48,STERILE: Brand: MEDLINE

## (undated) DEVICE — SVMMC ORTH SPL DRP PK

## (undated) DEVICE — BANDAGE COBAN 6 IN WND 6INX5YD FOAM

## (undated) DEVICE — SPONGE LAP W18XL18IN WHT COT 4 PLY FLD STRUNG RADPQ DISP ST 2 PER PACK

## (undated) DEVICE — LIQUIBAND RAPID ADHESIVE 36/CS 0.8ML: Brand: MEDLINE

## (undated) DEVICE — KIT EVAC 0.13IN RECT TB DIA10FR 400CC PVC 3 SPR Y CONN DRN

## (undated) DEVICE — SUTURE PDS II SZ 0 L18IN ABSRB VLT L36MM CT-1 1/2 CIR Z740D

## (undated) DEVICE — STRAP,POSITIONING,KNEE/BODY,FOAM,4X60": Brand: MEDLINE

## (undated) DEVICE — GUIDEWIRE ORTH L100CM DIA3MM S STL TEAR GTT

## (undated) DEVICE — CYSTO/BLADDER IRRIGATION SET, REGULATING CLAMP

## (undated) DEVICE — GLOVE ORTHO 8   MSG9480

## (undated) DEVICE — DRAPE,U/ SHT,SPLIT,PLAS,STERIL: Brand: MEDLINE

## (undated) DEVICE — CEMENT MIXING SYSTEM WITH FEMORAL BREAKWAY NOZZLE: Brand: REVOLUTION

## (undated) DEVICE — GOWN,AURORA,NONREINFORCED,LARGE: Brand: MEDLINE

## (undated) DEVICE — OPTIFOAM GENTLE AG,POST-OP STRIP,3.5X14: Brand: MEDLINE

## (undated) DEVICE — SURGICAL SUCTION CONNECTING TUBE WITH MALE CONNECTOR AND SUCTION CLAMP, 2 FT. LONG (.6 M), 5 MM I.D.: Brand: CONMED

## (undated) DEVICE — SOLUTION IRRIG 3000ML 0.9% SOD CHL USP UROMATIC PLAS CONT

## (undated) DEVICE — HANDPIECE SET WITH COAXIAL HIGH FLOW TIP AND SUCTION TUBE: Brand: INTERPULSE

## (undated) DEVICE — SUTURE NONABSORBABLE MONOFILAMENT 2-0 FS 18 IN ETHILON 664H

## (undated) DEVICE — SUTURE MCRYL SZ 3-0 L27IN ABSRB UD L24MM PS-1 3/8 CIR PRIM Y936H

## (undated) DEVICE — SUTURE VCRL SZ 2-0 L18IN ABSRB UD CT-1 L36MM 1/2 CIR J839D

## (undated) DEVICE — SUTURE MCRYL SZ 2-0 L27IN ABSRB UD SH L26MM TAPERPOINT NDL Y417H

## (undated) DEVICE — GLOVE ORANGE PI 8   MSG9080

## (undated) DEVICE — SUTURE VCRL SZ 0 L18IN ABSRB UD L36MM CT-1 1/2 CIR J840D

## (undated) DEVICE — C-ARM: Brand: UNBRANDED